# Patient Record
Sex: FEMALE | Race: WHITE | NOT HISPANIC OR LATINO | Employment: FULL TIME | URBAN - NONMETROPOLITAN AREA
[De-identification: names, ages, dates, MRNs, and addresses within clinical notes are randomized per-mention and may not be internally consistent; named-entity substitution may affect disease eponyms.]

---

## 2018-02-22 ENCOUNTER — OFFICE VISIT (OUTPATIENT)
Dept: OBSTETRICS AND GYNECOLOGY | Facility: CLINIC | Age: 38
End: 2018-02-22

## 2018-02-22 VITALS
SYSTOLIC BLOOD PRESSURE: 128 MMHG | BODY MASS INDEX: 30.18 KG/M2 | WEIGHT: 164 LBS | HEIGHT: 62 IN | DIASTOLIC BLOOD PRESSURE: 74 MMHG

## 2018-02-22 DIAGNOSIS — Z01.419 ENCOUNTER FOR GYNECOLOGICAL EXAMINATION WITHOUT ABNORMAL FINDING: Primary | ICD-10-CM

## 2018-02-22 DIAGNOSIS — N92.0 MENORRHAGIA WITH REGULAR CYCLE: ICD-10-CM

## 2018-02-22 PROCEDURE — 99385 PREV VISIT NEW AGE 18-39: CPT | Performed by: MIDWIFE

## 2018-02-22 RX ORDER — CETIRIZINE HYDROCHLORIDE 10 MG/1
10 TABLET ORAL DAILY
COMMUNITY
End: 2019-09-18 | Stop reason: ALTCHOICE

## 2018-02-22 NOTE — PROGRESS NOTES
Subjective   Chief Complaint   Patient presents with   • Gynecologic Exam     unsure of last pap, c/o heavy, painful periods that last longer since having BTL,     Vivien Rosa is a 37 y.o. year old  presenting to be seen for her annual exam.   Her periods are monthly lasting 5-6 days. She has heavy bleeding and cramping for 1 day.  She has had hemorrhoid banding x 2 and has issues with constipation.  She has had a tubal ligation.  C/O weight gain the past year but hasn't been as active.    Past Medical History:   Diagnosis Date   • Anemia    • History of blood transfusion         Current Outpatient Prescriptions:   •  cetirizine (zyrTEC) 10 MG tablet, Take 10 mg by mouth Daily., Disp: , Rfl:   •  Pediatric Multivitamins-Iron (FLINTSTONES PLUS IRON PO), Take  by mouth., Disp: , Rfl:    No Known Allergies   Past Surgical History:   Procedure Laterality Date   • HEMORROIDECTOMY     • MENG'S NEUROMA EXCISION Left    • TUBAL ABDOMINAL LIGATION        Social History     Social History   • Marital status:      Spouse name: N/A   • Number of children: N/A   • Years of education: N/A     Occupational History   • Not on file.     Social History Main Topics   • Smoking status: Never Smoker   • Smokeless tobacco: Never Used   • Alcohol use No   • Drug use: No   • Sexual activity: Not on file     Other Topics Concern   • Not on file     Social History Narrative      Family History   Problem Relation Age of Onset   • Hypertension Mother    • Hypertension Brother    • Stroke Maternal Grandmother        The following portions of the patient's history were reviewed and updated as appropriate:problem list, current medications, allergies, past family history, past medical history, past social history and past surgical history.    Review of Systems   Constitutional: Positive for unexpected weight change.   HENT: Negative.    Respiratory: Negative.    Cardiovascular: Negative.    Gastrointestinal: Positive for  "constipation.   Endocrine: Negative.    Genitourinary: Positive for menstrual problem.   Musculoskeletal: Negative.    Skin: Negative.    Neurological: Negative.    Psychiatric/Behavioral: Negative.            Objective   /74  Ht 157.5 cm (62\")  Wt 74.4 kg (164 lb)  LMP 02/12/2018  Breastfeeding? No  BMI 30 kg/m2    Physical Exam   Constitutional   The patient is awake, alert, well developed, well nourished and well groomed.   Neck   The neck is supple and the trachea is midline. The thyroid is not enlarged and there are no palpable nodules.   Breast  Inspection of the breast reveals symmetrical and smooth breast bilaterally without skin color changes, lesions, dimpling or skin retraction.  The nipples are  everted and without discharge.  Palpation of the breast the tissue is non-tender, smooth, without masses.  The axillae are without masses.   Respiratory  The patient is relaxed and breathes without effort. Lungs CTAB  Cardiovascular  Heart regular rate and rhythm without murmur.  Gastrointestinal   The abdomen is soft and non-tender.  No hepatosplenomegaly.  Genitourinary   - External Genitalia without erythema, lesions, or masses  -Urethral Meatus is without erythema, edema, prolapse or lesions.   -Bladder - Palpation at the region above the symphysis pubis             reveals no bladder tenderness.   -Vagina - There is no excessive vaginal discharge.&    vaginal walls reveals moist vaginal mucosa without inflammation or lesions    There is no evidence of pelvic relaxation; there is no cystocele or rectocele.  -Cervix  is smooth, pink, and without discharge. Negative cervical motion tenderness   Uterus - uterine body is of normal size, shape, & without tenderness  Adnexa structures are nontender and without masses  Perineum is without inflammation or lesions   Extremities  Full ROM. No cyanosis or edema   Skin  Normal in color, texture, moisture, temperature, mobility and turgor. There are no abnormal " lesions.    Psychiatric  The patient is oriented to person, place, and time. Speech is fluent and words are clear          Assessment /Plan      Vivien was seen today for gynecologic exam.    Diagnoses and all orders for this visit:    Encounter for gynecological examination without abnormal finding  -     Liquid-based Pap Smear, Screening; Future    Menorrhagia with regular cycle    Pap was done today.  If she does not receive the results of the Pap within 2 weeks  time, she was instructed to call to find out the results.  I explained to Vivien that the recommendations for Pap smear interval in a low risk patient has lengthened to 3 years time if cytology alone normal or  5 years time if both cytology and HPV testing were normal.  I encouraged her to be seen yearly for a full physical exam including breast and pelvic exam even during the off years when PAP's will not be performed.    Discussed treatment with OCs for cycle control.  May return to have a TVS if desires  Wants to continue to monitor for now.       Encouraged BSE    This note was electronically signed.    Sujata Maldonado CNM   February 22, 2018

## 2018-03-02 DIAGNOSIS — Z01.419 ENCOUNTER FOR GYNECOLOGICAL EXAMINATION WITHOUT ABNORMAL FINDING: ICD-10-CM

## 2018-11-09 ENCOUNTER — RESULTS ENCOUNTER (OUTPATIENT)
Dept: FAMILY MEDICINE CLINIC | Facility: CLINIC | Age: 38
End: 2018-11-09

## 2018-11-09 ENCOUNTER — OFFICE VISIT (OUTPATIENT)
Dept: FAMILY MEDICINE CLINIC | Facility: CLINIC | Age: 38
End: 2018-11-09

## 2018-11-09 VITALS
HEART RATE: 74 BPM | WEIGHT: 167 LBS | HEIGHT: 62 IN | BODY MASS INDEX: 30.73 KG/M2 | SYSTOLIC BLOOD PRESSURE: 112 MMHG | OXYGEN SATURATION: 98 % | DIASTOLIC BLOOD PRESSURE: 72 MMHG

## 2018-11-09 DIAGNOSIS — Z00.00 WELL WOMAN EXAM WITHOUT GYNECOLOGICAL EXAM: ICD-10-CM

## 2018-11-09 DIAGNOSIS — H04.123 DRY EYE SYNDROME, BILATERAL: ICD-10-CM

## 2018-11-09 DIAGNOSIS — Z13.1 SCREENING FOR DIABETES MELLITUS: ICD-10-CM

## 2018-11-09 DIAGNOSIS — Z13.6 ENCOUNTER FOR LIPID SCREENING FOR CARDIOVASCULAR DISEASE: ICD-10-CM

## 2018-11-09 DIAGNOSIS — Z00.00 WELL WOMAN EXAM WITHOUT GYNECOLOGICAL EXAM: Primary | ICD-10-CM

## 2018-11-09 DIAGNOSIS — Z13.220 ENCOUNTER FOR LIPID SCREENING FOR CARDIOVASCULAR DISEASE: ICD-10-CM

## 2018-11-09 DIAGNOSIS — G56.01 CARPAL TUNNEL SYNDROME ON RIGHT: ICD-10-CM

## 2018-11-09 DIAGNOSIS — Z23 NEED FOR HEPATITIS A IMMUNIZATION: ICD-10-CM

## 2018-11-09 PROCEDURE — 99385 PREV VISIT NEW AGE 18-39: CPT | Performed by: FAMILY MEDICINE

## 2018-11-09 RX ORDER — MELOXICAM 15 MG/1
TABLET ORAL
Refills: 0 | COMMUNITY
Start: 2018-09-21 | End: 2019-01-06

## 2018-11-09 NOTE — PATIENT INSTRUCTIONS
Preventive Care 18-39 Years, Female  Preventive care refers to lifestyle choices and visits with your health care provider that can promote health and wellness.  What does preventive care include?  · A yearly physical exam. This is also called an annual well check.  · Dental exams once or twice a year.  · Routine eye exams. Ask your health care provider how often you should have your eyes checked.  · Personal lifestyle choices, including:  ? Daily care of your teeth and gums.  ? Regular physical activity.  ? Eating a healthy diet.  ? Avoiding tobacco and drug use.  ? Limiting alcohol use.  ? Practicing safe sex.  ? Taking vitamin and mineral supplements as recommended by your health care provider.  What happens during an annual well check?  The services and screenings done by your health care provider during your annual well check will depend on your age, overall health, lifestyle risk factors, and family history of disease.  Counseling  Your health care provider may ask you questions about your:  · Alcohol use.  · Tobacco use.  · Drug use.  · Emotional well-being.  · Home and relationship well-being.  · Sexual activity.  · Eating habits.  · Work and work environment.  · Method of birth control.  · Menstrual cycle.  · Pregnancy history.    Screening  You may have the following tests or measurements:  · Height, weight, and BMI.  · Diabetes screening. This is done by checking your blood sugar (glucose) after you have not eaten for a while (fasting).  · Blood pressure.  · Lipid and cholesterol levels. These may be checked every 5 years starting at age 20.  · Skin check.  · Hepatitis C blood test.  · Hepatitis B blood test.  · Sexually transmitted disease (STD) testing.  · BRCA-related cancer screening. This may be done if you have a family history of breast, ovarian, tubal, or peritoneal cancers.  · Pelvic exam and Pap test. This may be done every 3 years starting at age 21. Starting at age 30, this may be done every 5  years if you have a Pap test in combination with an HPV test.    Discuss your test results, treatment options, and if necessary, the need for more tests with your health care provider.  Vaccines  Your health care provider may recommend certain vaccines, such as:  · Influenza vaccine. This is recommended every year.  · Tetanus, diphtheria, and acellular pertussis (Tdap, Td) vaccine. You may need a Td booster every 10 years.  · Varicella vaccine. You may need this if you have not been vaccinated.  · HPV vaccine. If you are 26 or younger, you may need three doses over 6 months.  · Measles, mumps, and rubella (MMR) vaccine. You may need at least one dose of MMR. You may also need a second dose.  · Pneumococcal 13-valent conjugate (PCV13) vaccine. You may need this if you have certain conditions and were not previously vaccinated.  · Pneumococcal polysaccharide (PPSV23) vaccine. You may need one or two doses if you smoke cigarettes or if you have certain conditions.  · Meningococcal vaccine. One dose is recommended if you are age 19-21 years and a first-year college student living in a residence [place], or if you have one of several medical conditions. You may also need additional booster doses.  · Hepatitis A vaccine. You may need this if you have certain conditions or if you travel or work in places where you may be exposed to hepatitis A.  · Hepatitis B vaccine. You may need this if you have certain conditions or if you travel or work in places where you may be exposed to hepatitis B.  · Haemophilus influenzae type b (Hib) vaccine. You may need this if you have certain risk factors.    Talk to your health care provider about which screenings and vaccines you need and how often you need them.  This information is not intended to replace advice given to you by your health care provider. Make sure you discuss any questions you have with your health care provider.  Document Released: 02/13/2003 Document Revised: 09/06/2017  Document Reviewed: 10/18/2016  [employer] Interactive Patient Education © 2018 [employer]

## 2018-11-09 NOTE — PROGRESS NOTES
Subjective   Vivien Rosa is a 38 y.o. female.     History of Present Illness  Mrs. Rosa presents today as a new pt to establish care. She was previously followed by Dr. Washington at [facility].     Reproductive History  [id number]  Pregnancy complications (HTN, DM): anemia, required blood tranfusion post-partum  Sexual Activity: yes, monogamous male   Contraception: s/p tubal  Menses: heavy but better on OCPs  STD hx: denies  H/O abnormal pap: no  Cervical procedures: none    Social Hx  Household members: self, spouse, 2 boys  Marital status:   Tobacco use: never  EtOH use: none  Illicit drug use: none    Lifestyle  Diet: varied, generally healthy, excess calories  Exercise: irregular  Using seatbelt: yes  Sunscreen: yes  Mental Health: no concerns  Feels safe in home: yes    Screenings  Last pap: 2018  Last breast exam: 2018  Last mammogram: n/a  Colonoscopy:  due to hemorrhoids  DEXA: n/a  FLP: ?1 year  BG/A1c: ?1 year  Vitamin D: unknown  Last dental check up: regular  Last vision exam: regular, seen for dry eyes, eye plugs in place  Immunizations UTD: yes    The following portions of the patient's history were reviewed and updated as appropriate: allergies, current medications, past family history, past medical history, past social history, past surgical history and problem list.    Review of Systems   Constitutional: Positive for unexpected weight change. Negative for appetite change, fatigue and fever.   HENT: Negative for congestion, ear pain, hearing loss, nosebleeds, rhinorrhea, sneezing, sore throat, tinnitus and trouble swallowing.    Eyes: Negative for pain, discharge and redness.        Dry eyes   Respiratory: Negative for cough, chest tightness, shortness of breath and wheezing.    Cardiovascular: Negative for chest pain, palpitations and leg swelling.   Gastrointestinal: Negative for abdominal pain, blood in stool, constipation, diarrhea, nausea and vomiting.   Endocrine:  Negative for cold intolerance, heat intolerance, polydipsia and polyuria.   Genitourinary: Positive for menstrual problem. Negative for dysuria, flank pain, frequency, hematuria and urgency.   Musculoskeletal: Negative for arthralgias, back pain, joint swelling, myalgias and neck pain.   Skin: Negative for rash and wound.   Allergic/Immunologic: Negative for food allergies.   Neurological: Positive for weakness (right wrist) and numbness (right wrist). Negative for dizziness, tremors, seizures, syncope, speech difficulty and headaches.   Hematological: Negative for adenopathy. Does not bruise/bleed easily.   Psychiatric/Behavioral: Negative for confusion, dysphoric mood, sleep disturbance and suicidal ideas. The patient is not nervous/anxious.        Objective    Vitals:    11/09/18 0927   BP: 112/72   Pulse: 74   SpO2: 98%     Body mass index is 30.54 kg/m².      11/09/18 0927   Weight: 75.8 kg (167 lb)       Physical Exam   Constitutional: She is oriented to person, place, and time. She appears well-developed and well-nourished. She is cooperative. She does not appear ill. No distress.   obese   HENT:   Head: Normocephalic and atraumatic.   Right Ear: Tympanic membrane, external ear and ear canal normal.   Left Ear: Tympanic membrane, external ear and ear canal normal.   Nose: Nose normal. No mucosal edema or rhinorrhea.   Mouth/Throat: Oropharynx is clear and moist and mucous membranes are normal. No oral lesions. No posterior oropharyngeal erythema.   Eyes: Conjunctivae, EOM and lids are normal.   Neck: Phonation normal. Neck supple. Normal carotid pulses present. No thyromegaly present.   Cardiovascular: Normal rate, regular rhythm, S1 normal, S2 normal and intact distal pulses. Exam reveals no gallop.   No murmur heard.  Pulmonary/Chest: Effort normal and breath sounds normal.   Musculoskeletal: Normal range of motion. She exhibits no edema, tenderness or deformity.     Vascular Status -  Her right foot  exhibits no edema. Her left foot exhibits no edema.  Lymphadenopathy:     She has no cervical adenopathy.   Neurological: She is alert and oriented to person, place, and time. She has normal strength. She displays no tremor. No cranial nerve deficit or sensory deficit. Gait normal.   Skin: Skin is warm and dry. No ecchymosis and no rash noted. No cyanosis. Nails show no clubbing.   Psychiatric: She has a normal mood and affect. Her speech is normal and behavior is normal. Judgment and thought content normal. Cognition and memory are normal.   Good eye contact. Answers questions appropriately. Good personal hygiene and grooming.   Nursing note and vitals reviewed.      Assessment/Plan   Vivien was seen today for establish care.    Diagnoses and all orders for this visit:    Well woman exam without gynecological exam  -     Cancel: CBC (No Diff)  -     Cancel: Comprehensive Metabolic Panel  -     Cancel: Lipid Panel  -     Cancel: Hemoglobin A1c  -     Cancel: TSH Rfx On Abnormal To Free T4  -     Comprehensive Metabolic Panel; Future  -     Lipid Panel; Future  -     Hemoglobin A1c; Future  -     TSH Rfx On Abnormal To Free T4; Future  -     CBC (No Diff); Future    Encounter for lipid screening for cardiovascular disease  -     Cancel: Lipid Panel  -     Lipid Panel; Future    Screening for diabetes mellitus  -     Cancel: Hemoglobin A1c  -     Hemoglobin A1c; Future    Carpal tunnel syndrome on right    Dry eye syndrome, bilateral       Age appropriate preventive care reviewed including cancer screenings, safety measures, mental health concerns, supplements, prevention of CV disease and DM, etc. Handout provided.    Pt advised to eat a heart healthy diet and get regular aerobic exercise.    I will contact patient regarding test results and provide instructions regarding any necessary changes in plan of care.    F/u with Dr. Leger regarding further tx of CTSx on right.     F/u with ophthalmology regarding tx of  chronic dry eye.    I will contact patient regarding test results and provide instructions regarding any necessary changes in plan of care.  Records requested from previous primary provider as well as any consulting physician, admitting hospitals, etc. Further recommendations pending review.  Patient was encouraged to keep me informed of any acute changes, lack of improvement, or any new concerning symptoms.  Pt is aware of reasons to seek emergent care.  Patient voiced understanding of all instructions and denied further questions.

## 2018-11-10 PROBLEM — N60.12 FIBROCYSTIC BREAST CHANGES OF BOTH BREASTS: Status: ACTIVE | Noted: 2018-11-10

## 2018-11-10 PROBLEM — N60.11 FIBROCYSTIC BREAST CHANGES OF BOTH BREASTS: Status: ACTIVE | Noted: 2018-11-10

## 2018-11-10 PROBLEM — Z87.09 HISTORY OF ACUTE BRONCHITIS WITH BRONCHOSPASM: Status: ACTIVE | Noted: 2018-11-10

## 2018-11-10 PROBLEM — G56.01 CARPAL TUNNEL SYNDROME ON RIGHT: Status: ACTIVE | Noted: 2018-11-10

## 2018-11-10 PROBLEM — H04.123 DRY EYE SYNDROME, BILATERAL: Status: ACTIVE | Noted: 2018-11-10

## 2018-11-13 DIAGNOSIS — D75.839 THROMBOCYTOSIS: Primary | ICD-10-CM

## 2018-11-13 LAB
ALBUMIN SERPL-MCNC: 4.5 G/DL (ref 3.5–5.5)
ALBUMIN/GLOB SERPL: 1.9 {RATIO} (ref 1.2–2.2)
ALP SERPL-CCNC: 84 IU/L (ref 39–117)
ALT SERPL-CCNC: 20 IU/L (ref 0–32)
AST SERPL-CCNC: 16 IU/L (ref 0–40)
BILIRUB SERPL-MCNC: 0.3 MG/DL (ref 0–1.2)
BUN SERPL-MCNC: 14 MG/DL (ref 6–20)
BUN/CREAT SERPL: 22 (ref 9–23)
CALCIUM SERPL-MCNC: 9.1 MG/DL (ref 8.7–10.2)
CHLORIDE SERPL-SCNC: 102 MMOL/L (ref 96–106)
CHOLEST SERPL-MCNC: 143 MG/DL (ref 100–199)
CO2 SERPL-SCNC: 22 MMOL/L (ref 20–29)
CREAT SERPL-MCNC: 0.63 MG/DL (ref 0.57–1)
ERYTHROCYTE [DISTWIDTH] IN BLOOD BY AUTOMATED COUNT: 13.5 % (ref 12.3–15.4)
GLOBULIN SER CALC-MCNC: 2.4 G/DL (ref 1.5–4.5)
GLUCOSE SERPL-MCNC: 82 MG/DL (ref 65–99)
HBA1C MFR BLD: 5.5 % (ref 4.8–5.6)
HCT VFR BLD AUTO: 36.2 % (ref 34–46.6)
HDLC SERPL-MCNC: 53 MG/DL
HGB BLD-MCNC: 11.9 G/DL (ref 11.1–15.9)
LDLC SERPL CALC-MCNC: 67 MG/DL (ref 0–99)
MCH RBC QN AUTO: 28.7 PG (ref 26.6–33)
MCHC RBC AUTO-ENTMCNC: 32.9 G/DL (ref 31.5–35.7)
MCV RBC AUTO: 87 FL (ref 79–97)
PLATELET # BLD AUTO: 449 X10E3/UL (ref 150–379)
POTASSIUM SERPL-SCNC: 3.8 MMOL/L (ref 3.5–5.2)
PROT SERPL-MCNC: 6.9 G/DL (ref 6–8.5)
RBC # BLD AUTO: 4.15 X10E6/UL (ref 3.77–5.28)
SODIUM SERPL-SCNC: 141 MMOL/L (ref 134–144)
TRIGL SERPL-MCNC: 117 MG/DL (ref 0–149)
TSH SERPL DL<=0.005 MIU/L-ACNC: 1.53 UIU/ML (ref 0.45–4.5)
VLDLC SERPL CALC-MCNC: 23 MG/DL (ref 5–40)
WBC # BLD AUTO: 8.7 X10E3/UL (ref 3.4–10.8)

## 2018-12-14 ENCOUNTER — TRANSCRIBE ORDERS (OUTPATIENT)
Dept: ADMINISTRATIVE | Facility: HOSPITAL | Age: 38
End: 2018-12-14

## 2018-12-14 DIAGNOSIS — R20.2 NUMBNESS AND TINGLING: Primary | ICD-10-CM

## 2018-12-14 DIAGNOSIS — R20.0 NUMBNESS AND TINGLING: Primary | ICD-10-CM

## 2018-12-26 ENCOUNTER — HOSPITAL ENCOUNTER (OUTPATIENT)
Dept: MRI IMAGING | Facility: HOSPITAL | Age: 38
Discharge: HOME | End: 2018-12-26
Attending: ORTHOPAEDIC SURGERY | Admitting: ORTHOPAEDIC SURGERY

## 2018-12-26 DIAGNOSIS — R20.0 NUMBNESS AND TINGLING: ICD-10-CM

## 2018-12-26 DIAGNOSIS — R20.2 NUMBNESS AND TINGLING: ICD-10-CM

## 2018-12-26 PROCEDURE — 70553 MRI BRAIN STEM W/O & W/DYE: CPT

## 2018-12-26 PROCEDURE — A9577 INJ MULTIHANCE: HCPCS | Performed by: ORTHOPAEDIC SURGERY

## 2018-12-26 PROCEDURE — 0 GADOBENATE DIMEGLUMINE 529 MG/ML SOLUTION: Performed by: ORTHOPAEDIC SURGERY

## 2018-12-26 RX ADMIN — GADOBENATE DIMEGLUMINE 15 ML: 529 INJECTION, SOLUTION INTRAVENOUS at 13:39

## 2019-01-06 ENCOUNTER — OFFICE VISIT (OUTPATIENT)
Dept: RETAIL CLINIC | Facility: CLINIC | Age: 39
End: 2019-01-06

## 2019-01-06 VITALS
DIASTOLIC BLOOD PRESSURE: 74 MMHG | OXYGEN SATURATION: 98 % | HEART RATE: 78 BPM | WEIGHT: 167 LBS | TEMPERATURE: 97.3 F | SYSTOLIC BLOOD PRESSURE: 122 MMHG | RESPIRATION RATE: 20 BRPM | BODY MASS INDEX: 30.54 KG/M2

## 2019-01-06 DIAGNOSIS — J20.9 ACUTE BRONCHITIS, UNSPECIFIED ORGANISM: ICD-10-CM

## 2019-01-06 DIAGNOSIS — J01.40 ACUTE NON-RECURRENT PANSINUSITIS: Primary | ICD-10-CM

## 2019-01-06 PROCEDURE — 99213 OFFICE O/P EST LOW 20 MIN: CPT | Performed by: NURSE PRACTITIONER

## 2019-01-06 RX ORDER — BROMPHENIRAMINE MALEATE, PSEUDOEPHEDRINE HYDROCHLORIDE, AND DEXTROMETHORPHAN HYDROBROMIDE 2; 30; 10 MG/5ML; MG/5ML; MG/5ML
10 SYRUP ORAL 4 TIMES DAILY PRN
Qty: 200 ML | Refills: 0 | Status: SHIPPED | OUTPATIENT
Start: 2019-01-06 | End: 2019-01-11

## 2019-01-06 RX ORDER — FLUTICASONE PROPIONATE 50 MCG
1 SPRAY, SUSPENSION (ML) NASAL 2 TIMES DAILY
Qty: 1 BOTTLE | Refills: 1 | Status: SHIPPED | OUTPATIENT
Start: 2019-01-06 | End: 2019-01-13

## 2019-01-06 RX ORDER — PREDNISONE 10 MG/1
TABLET ORAL DAILY
Qty: 21 EACH | Refills: 0 | Status: SHIPPED | OUTPATIENT
Start: 2019-01-06 | End: 2019-01-12

## 2019-01-06 NOTE — PATIENT INSTRUCTIONS
Sinusitis, Adult  Sinusitis is soreness and inflammation of your sinuses. Sinuses are hollow spaces in the bones around your face. They are located:  · Around your eyes.  · In the middle of your forehead.  · Behind your nose.  · In your cheekbones.    Your sinuses and nasal passages are lined with a stringy fluid (mucus). Mucus normally drains out of your sinuses. When your nasal tissues get inflamed or swollen, the mucus can get trapped or blocked so air cannot flow through your sinuses. This lets bacteria, viruses, and funguses grow, and that leads to infection.  Follow these instructions at home:  Medicines  · Take, use, or apply over-the-counter and prescription medicines only as told by your doctor. These may include nasal sprays.  · If you were prescribed an antibiotic medicine, take it as told by your doctor. Do not stop taking the antibiotic even if you start to feel better.  Hydrate and Humidify  · Drink enough water to keep your pee (urine) clear or pale yellow.  · Use a cool mist humidifier to keep the humidity level in your home above 50%.  · Breathe in steam for 10-15 minutes, 3-4 times a day or as told by your doctor. You can do this in the bathroom while a hot shower is running.  · Try not to spend time in cool or dry air.  Rest  · Rest as much as possible.  · Sleep with your head raised (elevated).  · Make sure to get enough sleep each night.  General instructions  · Put a warm, moist washcloth on your face 3-4 times a day or as told by your doctor. This will help with discomfort.  · Wash your hands often with soap and water. If there is no soap and water, use hand .  · Do not smoke. Avoid being around people who are smoking (secondhand smoke).  · Keep all follow-up visits as told by your doctor. This is important.  Contact a doctor if:  · You have a fever.  · Your symptoms get worse.  · Your symptoms do not get better within 10 days.  Get help right away if:  · You have a very bad  headache.  · You cannot stop throwing up (vomiting).  · You have pain or swelling around your face or eyes.  · You have trouble seeing.  · You feel confused.  · Your neck is stiff.  · You have trouble breathing.  This information is not intended to replace advice given to you by your health care provider. Make sure you discuss any questions you have with your health care provider.  Document Released: 06/05/2009 Document Revised: 08/13/2017 Document Reviewed: 10/12/2016  [employer] Interactive Patient Education © 2018 [employer]  Acute Bronchitis, Adult  Acute bronchitis is when air tubes (bronchi) in the lungs suddenly get swollen. The condition can make it hard to breathe. It can also cause these symptoms:  · A cough.  · Coughing up clear, yellow, or green mucus.  · Wheezing.  · Chest congestion.  · Shortness of breath.  · A fever.  · Body aches.  · Chills.  · A sore throat.    Follow these instructions at home:  Medicines  · Take over-the-counter and prescription medicines only as told by your doctor.  · If you were prescribed an antibiotic medicine, take it as told by your doctor. Do not stop taking the antibiotic even if you start to feel better.  General instructions  · Rest.  · Drink enough fluids to keep your pee (urine) clear or pale yellow.  · Avoid smoking and secondhand smoke. If you smoke and you need help quitting, ask your doctor. Quitting will help your lungs heal faster.  · Use an inhaler, cool mist vaporizer, or humidifier as told by your doctor.  · Keep all follow-up visits as told by your doctor. This is important.  How is this prevented?  To lower your risk of getting this condition again:  · Wash your hands often with soap and water. If you cannot use soap and water, use hand .  · Avoid contact with people who have cold symptoms.  · Try not to touch your hands to your mouth, nose, or eyes.  · Make sure to get the flu shot every year.    Contact a doctor if:  · Your symptoms do not get  better in 2 weeks.  Get help right away if:  · You cough up blood.  · You have chest pain.  · You have very bad shortness of breath.  · You become dehydrated.  · You faint (pass out) or keep feeling like you are going to pass out.  · You keep throwing up (vomiting).  · You have a very bad headache.  · Your fever or chills gets worse.  This information is not intended to replace advice given to you by your health care provider. Make sure you discuss any questions you have with your health care provider.  Document Released: 06/05/2009 Document Revised: 07/26/2017 Document Reviewed: 06/07/2017  [employer] Interactive Patient Education © 2018 [employer]

## 2019-01-06 NOTE — PROGRESS NOTES
"URI      Subjective   Vivien Rosa is a 38 y.o. female.     URI    This is a new problem. Episode onset: 2 days ago  The problem has been gradually worsening. There has been no fever. Associated symptoms include chest pain (lower throat, upper chest \"burning\" ), congestion, coughing, ear pain (bilaterally; R>L ), headaches, rhinorrhea, sneezing and a sore throat. Pertinent negatives include no abdominal pain, diarrhea, nausea, rash, vomiting or wheezing. Treatments tried: Neti pot, Farhana seltzer cold and sinus.  The treatment provided no relief.    Has had influenza vaccine.   ill with similar symptoms.  See ROS.      Patient Active Problem List   Diagnosis   • Vertigo   • Carpal tunnel syndrome on right   • Fibrocystic breast changes of both breasts   • Dry eye syndrome, bilateral   • History of acute bronchitis with bronchospasm       No Known Allergies     Current Outpatient Medications on File Prior to Visit   Medication Sig Dispense Refill   • cetirizine (zyrTEC) 10 MG tablet Take 10 mg by mouth Daily.     • Pediatric Multivitamins-Iron (FLINTSTONES PLUS IRON PO) Take  by mouth.     • [DISCONTINUED] meloxicam (MOBIC) 15 MG tablet TK 1 T PO ONCE QD  0     No current facility-administered medications on file prior to visit.        Past Medical History:   Diagnosis Date   • Allergic    • Anemia    • History of blood transfusion 2014    post-partum   • Sinusitis    • Vertigo        Past Surgical History:   Procedure Laterality Date   • HEMORROIDECTOMY     • MENG'S NEUROMA EXCISION Left    • TUBAL ABDOMINAL LIGATION     • VARICOSE VEIN SURGERY         Family History   Problem Relation Age of Onset   • Hypertension Mother    • Hypertension Brother    • Stroke Maternal Grandmother    • Heart disease Father    • Heart attack Father    • Thyroid disease Father    • Alcohol abuse Father    • Asperger's syndrome Sister        Social History     Socioeconomic History   • Marital status:      Spouse name: " "Not on file   • Number of children: Not on file   • Years of education: Not on file   • Highest education level: Not on file   Social Needs   • Financial resource strain: Not on file   • Food insecurity - worry: Not on file   • Food insecurity - inability: Not on file   • Transportation needs - medical: Not on file   • Transportation needs - non-medical: Not on file   Occupational History   • Occupation:    Tobacco Use   • Smoking status: Never Smoker   • Smokeless tobacco: Never Used   Substance and Sexual Activity   • Alcohol use: No   • Drug use: No   • Sexual activity: Yes     Partners: Male     Birth control/protection: Surgical   Other Topics Concern   • Not on file   Social History Narrative   • Not on file       Travel:  No recent travel within the last 21 days outside the [place] Denies recent travel to one of the following [place]  Countries:  [place], [place], [place], or [place].  Denies contact with anyone who has traveled to one of the following [place]  Countries: [place], [place], [place], or [place] within the last 21 days and is known or suspected to have Ebola.  Denies having had any contact with the human remains, blood or any bodily fluids of someone who is known or suspected to have Ebola within the last 21 days.     OB History      Para Term  AB Living    2 2 2     2    SAB TAB Ectopic Molar Multiple Live Births              2          Review of Systems   Constitutional: Negative for chills, diaphoresis and fever.   HENT: Positive for congestion, dental problem, ear pain (bilaterally; R>L ), postnasal drip, rhinorrhea, sinus pressure, sneezing, sore throat and voice change (hoarseness, voice loss ). Negative for ear discharge, mouth sores and nosebleeds.    Respiratory: Positive for cough. Negative for shortness of breath and wheezing.    Cardiovascular: Positive for chest pain (lower throat, upper chest \"burning\" ).   Gastrointestinal: Negative for abdominal " pain, diarrhea, nausea and vomiting.   Musculoskeletal: Negative for myalgias.   Skin: Negative for rash.   Neurological: Positive for light-headedness and headaches.       /74 (BP Location: Right arm, Patient Position: Sitting, Cuff Size: Adult)   Pulse 78   Temp 97.3 °F (36.3 °C) (Temporal)   Resp 20   Wt 75.8 kg (167 lb)   LMP 12/08/2018 (Exact Date)   SpO2 98%   Breastfeeding? No   BMI 30.54 kg/m²     Objective   Physical Exam   Constitutional: She is oriented to person, place, and time. She appears well-developed and well-nourished. She is cooperative. She appears ill (mild ). No distress.   HENT:   Head: Normocephalic.   Right Ear: External ear and ear canal normal. Tympanic membrane is not injected, not scarred, not perforated, not erythematous, not retracted and not bulging. A middle ear effusion (mild serous ) is present.   Left Ear: External ear and ear canal normal. Tympanic membrane is not injected, not scarred, not perforated, not erythematous, not retracted and not bulging. A middle ear effusion (mild serous ) is present.   Nose: Mucosal edema (boggy nasal turbinates ) and rhinorrhea (clear ) present. Right sinus exhibits maxillary sinus tenderness and frontal sinus tenderness. Left sinus exhibits maxillary sinus tenderness and frontal sinus tenderness.   Mouth/Throat: Uvula is midline. Mucous membranes are dry. No posterior oropharyngeal edema or posterior oropharyngeal erythema. Tonsils are 1+ on the right. Tonsils are 1+ on the left. No tonsillar exudate.   Cardiovascular: Normal rate, regular rhythm and normal heart sounds.   Pulmonary/Chest: Effort normal and breath sounds normal. She has no decreased breath sounds. She has no wheezes. She has no rhonchi. She has no rales.   Lymphadenopathy:        Head (right side): Tonsillar adenopathy present. No preauricular, no posterior auricular and no occipital adenopathy present.        Head (left side): Tonsillar adenopathy present. No  preauricular, no posterior auricular and no occipital adenopathy present.     She has no cervical adenopathy.   Neurological: She is alert and oriented to person, place, and time.   Skin: Skin is warm, dry and intact. No rash noted.       Assessment/Plan   Vivien was seen today for uri.    Diagnoses and all orders for this visit:    Acute non-recurrent pansinusitis  -     fluticasone (FLONASE ALLERGY RELIEF) 50 MCG/ACT nasal spray; 1 spray into the nostril(s) as directed by provider 2 (Two) Times a Day for 7 days.  -     brompheniramine-pseudoephedrine-DM 30-2-10 MG/5ML syrup; Take 10 mL by mouth 4 (Four) Times a Day As Needed for Congestion or Cough for up to 5 days.    Acute bronchitis, unspecified organism  -     PredniSONE (DELTASONE) 10 MG (21) tablet pack; Take  by mouth Daily for 6 days.  -     brompheniramine-pseudoephedrine-DM 30-2-10 MG/5ML syrup; Take 10 mL by mouth 4 (Four) Times a Day As Needed for Congestion or Cough for up to 5 days.    Rest, increase fluids, warm salt water gargles, neti pot rinses.  Follow up with PCP if symptoms worsen/do not improve.  Visit summary provided today.  Questions/concerns addressed.      Return if symptoms worsen or fail to improve.

## 2019-01-10 ENCOUNTER — TELEPHONE (OUTPATIENT)
Dept: RETAIL CLINIC | Facility: CLINIC | Age: 39
End: 2019-01-10

## 2019-01-10 PROBLEM — M50.30 DDD (DEGENERATIVE DISC DISEASE), CERVICAL: Status: ACTIVE | Noted: 2019-01-10

## 2019-01-10 RX ORDER — FLUCONAZOLE 150 MG/1
150 TABLET ORAL ONCE
Qty: 1 TABLET | Refills: 0 | Status: SHIPPED | OUTPATIENT
Start: 2019-01-10 | End: 2019-01-10

## 2019-01-10 RX ORDER — CEFDINIR 300 MG/1
300 CAPSULE ORAL 2 TIMES DAILY
Qty: 20 CAPSULE | Refills: 0 | Status: SHIPPED | OUTPATIENT
Start: 2019-01-10 | End: 2019-01-20

## 2019-01-10 RX ORDER — DEXTROMETHORPHAN HYDROBROMIDE AND PROMETHAZINE HYDROCHLORIDE 15; 6.25 MG/5ML; MG/5ML
5 SYRUP ORAL 4 TIMES DAILY PRN
Qty: 118 ML | Refills: 0 | Status: SHIPPED | OUTPATIENT
Start: 2019-01-10 | End: 2019-02-20 | Stop reason: SDUPTHER

## 2019-01-10 NOTE — TELEPHONE ENCOUNTER
Called patient to check on her today.  States that sinus pressure has improved some.  But cough continues to be bad.  Medications prescribed today.  Plan of care discussed. Advised her to not drive and/or operate heavy machinery while taking the Promethazine cough syrup and to give it 6 hours between taking it and the Bromfed DM.  If symptoms do not improve with new medications.  FU with PCP.  Patient verbalized understanding of instructions given today.  Visit summary provided.  Questions/concerns addressed.

## 2019-02-20 ENCOUNTER — OFFICE VISIT (OUTPATIENT)
Dept: FAMILY MEDICINE CLINIC | Facility: CLINIC | Age: 39
End: 2019-02-20

## 2019-02-20 VITALS
WEIGHT: 167 LBS | OXYGEN SATURATION: 98 % | TEMPERATURE: 98 F | DIASTOLIC BLOOD PRESSURE: 80 MMHG | HEART RATE: 87 BPM | BODY MASS INDEX: 30.73 KG/M2 | SYSTOLIC BLOOD PRESSURE: 130 MMHG | HEIGHT: 62 IN

## 2019-02-20 DIAGNOSIS — J06.9 ACUTE URI: Primary | ICD-10-CM

## 2019-02-20 DIAGNOSIS — R06.83 SNORING: ICD-10-CM

## 2019-02-20 DIAGNOSIS — R05.9 COUGH: ICD-10-CM

## 2019-02-20 DIAGNOSIS — J02.9 SORE THROAT: ICD-10-CM

## 2019-02-20 DIAGNOSIS — R50.9 FEVER, UNSPECIFIED FEVER CAUSE: ICD-10-CM

## 2019-02-20 DIAGNOSIS — J98.01 BRONCHOSPASM: ICD-10-CM

## 2019-02-20 DIAGNOSIS — R29.818 SUSPECTED SLEEP APNEA: ICD-10-CM

## 2019-02-20 LAB
EXPIRATION DATE: NORMAL
EXPIRATION DATE: NORMAL
FLUAV AG NPH QL: NEGATIVE
FLUBV AG NPH QL: NEGATIVE
INTERNAL CONTROL: NORMAL
INTERNAL CONTROL: NORMAL
Lab: NORMAL
Lab: NORMAL
S PYO AG THROAT QL: NEGATIVE

## 2019-02-20 PROCEDURE — 99214 OFFICE O/P EST MOD 30 MIN: CPT | Performed by: FAMILY MEDICINE

## 2019-02-20 PROCEDURE — 96372 THER/PROPH/DIAG INJ SC/IM: CPT | Performed by: FAMILY MEDICINE

## 2019-02-20 PROCEDURE — 87880 STREP A ASSAY W/OPTIC: CPT | Performed by: FAMILY MEDICINE

## 2019-02-20 PROCEDURE — 87804 INFLUENZA ASSAY W/OPTIC: CPT | Performed by: FAMILY MEDICINE

## 2019-02-20 RX ORDER — DOXYCYCLINE 100 MG/1
100 TABLET ORAL 2 TIMES DAILY
Qty: 20 TABLET | Refills: 0 | Status: SHIPPED | OUTPATIENT
Start: 2019-02-20 | End: 2019-03-02

## 2019-02-20 RX ORDER — METHYLPREDNISOLONE ACETATE 80 MG/ML
120 INJECTION, SUSPENSION INTRA-ARTICULAR; INTRALESIONAL; INTRAMUSCULAR; SOFT TISSUE ONCE
Status: COMPLETED | OUTPATIENT
Start: 2019-02-20 | End: 2019-02-20

## 2019-02-20 RX ORDER — ALBUTEROL SULFATE 90 UG/1
2 AEROSOL, METERED RESPIRATORY (INHALATION) EVERY 4 HOURS PRN
Qty: 18 G | Refills: 1 | Status: SHIPPED | OUTPATIENT
Start: 2019-02-20 | End: 2019-11-19 | Stop reason: SDUPTHER

## 2019-02-20 RX ORDER — FLUTICASONE PROPIONATE 50 MCG
SPRAY, SUSPENSION (ML) NASAL
Refills: 1 | COMMUNITY
Start: 2019-02-06 | End: 2020-06-09

## 2019-02-20 RX ORDER — BENZONATATE 200 MG/1
200 CAPSULE ORAL 3 TIMES DAILY PRN
Qty: 30 CAPSULE | Refills: 0 | Status: SHIPPED | OUTPATIENT
Start: 2019-02-20 | End: 2019-09-18

## 2019-02-20 RX ORDER — DEXTROMETHORPHAN HYDROBROMIDE AND PROMETHAZINE HYDROCHLORIDE 15; 6.25 MG/5ML; MG/5ML
5 SYRUP ORAL 4 TIMES DAILY PRN
Qty: 118 ML | Refills: 0 | Status: SHIPPED | OUTPATIENT
Start: 2019-02-20 | End: 2019-02-25

## 2019-02-20 RX ADMIN — METHYLPREDNISOLONE ACETATE 120 MG: 80 INJECTION, SUSPENSION INTRA-ARTICULAR; INTRALESIONAL; INTRAMUSCULAR; SOFT TISSUE at 10:38

## 2019-02-20 NOTE — PROGRESS NOTES
Subjective   Vivien Rosa is a 38 y.o. female.     She c/o cough, congestion      Cough   This is a new problem. The current episode started in the past 7 days. The problem has been rapidly worsening. The problem occurs every few minutes. The cough is productive of sputum. Associated symptoms include chest pain (with cough), ear congestion, ear pain, a fever (subjective), headaches, nasal congestion, postnasal drip, rhinorrhea, a sore throat, shortness of breath and wheezing. Pertinent negatives include no chills, eye redness, hemoptysis, myalgias or rash (resolved). The symptoms are aggravated by lying down, exercise and cold air. She has tried OTC cough suppressant (flonase) for the symptoms. The treatment provided no relief. treated at  approx 1 month ago for bronchitis with cough syrup and prednisone   She believes the prednisone may have caused a rash on her neck, chest.    The following portions of the patient's history were reviewed and updated as appropriate: allergies, current medications, past family history, past medical history, past social history, past surgical history and problem list.    Review of Systems   Constitutional: Positive for fatigue and fever (subjective). Negative for chills.   HENT: Positive for congestion, ear pain, postnasal drip, rhinorrhea, sinus pain and sore throat. Negative for mouth sores.    Eyes: Negative for pain, discharge and redness.   Respiratory: Positive for cough, shortness of breath and wheezing. Negative for hemoptysis.    Cardiovascular: Positive for chest pain (with cough).   Gastrointestinal: Negative for diarrhea, nausea and vomiting.   Genitourinary: Negative for dysuria and hematuria.   Musculoskeletal: Negative for myalgias.   Skin: Negative for rash (resolved).   Neurological: Positive for dizziness and headaches.   Hematological: Negative for adenopathy.   Psychiatric/Behavioral: Positive for sleep disturbance (due to cough). Negative for confusion.        Objective    Vitals:    02/20/19 1001   BP: 130/80   Pulse: 87   Temp: 98 °F (36.7 °C)   SpO2: 98%     Body mass index is 30.54 kg/m².      02/20/19  1001   Weight: 75.8 kg (167 lb)       Physical Exam   Constitutional: She is oriented to person, place, and time. She appears well-developed and well-nourished. She is cooperative. She appears ill. No distress.   HENT:   Head: Normocephalic and atraumatic.   Right Ear: External ear and ear canal normal. Tympanic membrane is injected and retracted.   Left Ear: External ear and ear canal normal. Tympanic membrane is injected and retracted.   Nose: Mucosal edema and rhinorrhea (mucoid) present.   Mouth/Throat: Mucous membranes are not dry. No oral lesions. Posterior oropharyngeal erythema present. No oropharyngeal exudate.   Mallampati class 3-4   Eyes: Conjunctivae and lids are normal.   Neck: Neck supple.   Cardiovascular: Normal rate, regular rhythm, normal heart sounds and intact distal pulses.   Pulmonary/Chest: Effort normal. No respiratory distress (paroxsymal cough on expiration). She has decreased breath sounds. She has no wheezes. She has no rhonchi. She has no rales.   Lymphadenopathy:     She has no cervical adenopathy.   Neurological: She is alert and oriented to person, place, and time.   Skin: Skin is warm and dry. No rash noted.   Psychiatric: She has a normal mood and affect. Her behavior is normal. Cognition and memory are normal.   Nursing note and vitals reviewed.    Recent Results (from the past 24 hour(s))   POC Influenza A / B    Collection Time: 02/20/19  1:24 PM   Result Value Ref Range    Rapid Influenza A Ag Negative Negative    Rapid Influenza B Ag Negative Negative    Internal Control Passed Passed    Lot Number [id number]     Expiration Date 12/6/2020    POC Rapid Strep A    Collection Time: 02/20/19  1:25 PM   Result Value Ref Range    Rapid Strep A Screen Negative Negative, VALID, INVALID, Not Performed    Internal Control Passed  Passed    Lot Number [id number]     Expiration Date 3/31/2020      Assessment/Plan   Vivien was seen today for cough, nasal congestion, earache, sore throat and fever.    Diagnoses and all orders for this visit:    Acute URI    Cough  -     POC Rapid Strep A  -     POC Influenza A / B    Fever, unspecified fever cause  -     POC Rapid Strep A  -     POC Influenza A / B    Sore throat  -     POC Rapid Strep A  -     POC Influenza A / B    Bronchospasm  -     methylPREDNISolone acetate (DEPO-medrol) injection 120 mg; Inject 1.5 mL into the appropriate muscle as directed by prescriber 1 (One) Time.    Snoring    Suspected sleep apnea    Other orders  -     doxycycline (ADOXA) 100 MG tablet; Take 1 tablet by mouth 2 (Two) Times a Day for 10 days.  -     promethazine-dextromethorphan (PROMETHAZINE-DM) 6.25-15 MG/5ML syrup; Take 5 mL by mouth 4 (Four) Times a Day As Needed for Cough for up to 5 days.  -     albuterol sulfate  (90 Base) MCG/ACT inhaler; Inhale 2 puffs Every 4 (Four) Hours As Needed for Wheezing or Shortness of Air.  -     benzonatate (TESSALON) 200 MG capsule; Take 1 capsule by mouth 3 (Three) Times a Day As Needed for Cough.      Patient treated for similar symptoms approximately a month ago but not on antibiotics at that time.  She has had an acute recurrence of symptoms within the past 4-5 days.  Rapid progression to paroxysmal cough, bronchospasm, productive sputum.  For that reason antibiotics as above, antitussives, albuterol MDI and 1 dose in Depo-Medrol as she did not tolerate prednisone well.    I also suspect she may have undiagnosed sleep apnea based on chronic fatigue, chronic loud snoring and Mallampati class 3-4 on exam.  Consider sleep study when she is well.    Patient was encouraged to keep me informed of any acute changes, lack of improvement, or any new concerning symptoms.  Pt is aware of reasons to seek emergent care.  Patient voiced understanding of all instructions and  denied further questions.        Please note that portions of this note may have been completed with a voice recognition program. Efforts were made to edit the dictations, but occasionally words are mistranscribed.

## 2019-02-25 ENCOUNTER — RESULTS ENCOUNTER (OUTPATIENT)
Dept: FAMILY MEDICINE CLINIC | Facility: CLINIC | Age: 39
End: 2019-02-25

## 2019-02-25 DIAGNOSIS — D75.839 THROMBOCYTOSIS: ICD-10-CM

## 2019-06-13 ENCOUNTER — CLINICAL SUPPORT (OUTPATIENT)
Dept: FAMILY MEDICINE CLINIC | Facility: CLINIC | Age: 39
End: 2019-06-13

## 2019-06-13 DIAGNOSIS — Z23 NEED FOR HEPATITIS A IMMUNIZATION: ICD-10-CM

## 2019-06-13 PROCEDURE — 90632 HEPA VACCINE ADULT IM: CPT | Performed by: FAMILY MEDICINE

## 2019-06-13 PROCEDURE — 90471 IMMUNIZATION ADMIN: CPT | Performed by: FAMILY MEDICINE

## 2019-09-18 ENCOUNTER — OFFICE VISIT (OUTPATIENT)
Dept: FAMILY MEDICINE CLINIC | Facility: CLINIC | Age: 39
End: 2019-09-18

## 2019-09-18 VITALS
HEART RATE: 79 BPM | SYSTOLIC BLOOD PRESSURE: 122 MMHG | DIASTOLIC BLOOD PRESSURE: 80 MMHG | WEIGHT: 169 LBS | OXYGEN SATURATION: 99 % | BODY MASS INDEX: 30.91 KG/M2

## 2019-09-18 DIAGNOSIS — Z23 NEED FOR INFLUENZA VACCINATION: ICD-10-CM

## 2019-09-18 DIAGNOSIS — Z00.00 WELL WOMAN EXAM WITHOUT GYNECOLOGICAL EXAM: Primary | ICD-10-CM

## 2019-09-18 DIAGNOSIS — Z13.1 SCREENING FOR DIABETES MELLITUS: ICD-10-CM

## 2019-09-18 DIAGNOSIS — Z12.31 ENCOUNTER FOR SCREENING MAMMOGRAM FOR BREAST CANCER: ICD-10-CM

## 2019-09-18 DIAGNOSIS — N60.11 FIBROCYSTIC BREAST CHANGES OF BOTH BREASTS: ICD-10-CM

## 2019-09-18 DIAGNOSIS — N60.12 FIBROCYSTIC BREAST CHANGES OF BOTH BREASTS: ICD-10-CM

## 2019-09-18 DIAGNOSIS — N92.6 IRREGULAR MENSES: ICD-10-CM

## 2019-09-18 DIAGNOSIS — Z13.220 SCREENING FOR LIPID DISORDERS: ICD-10-CM

## 2019-09-18 PROCEDURE — 90471 IMMUNIZATION ADMIN: CPT | Performed by: FAMILY MEDICINE

## 2019-09-18 PROCEDURE — 90674 CCIIV4 VAC NO PRSV 0.5 ML IM: CPT | Performed by: FAMILY MEDICINE

## 2019-09-18 PROCEDURE — 99396 PREV VISIT EST AGE 40-64: CPT | Performed by: FAMILY MEDICINE

## 2019-09-18 RX ORDER — NORGESTIMATE AND ETHINYL ESTRADIOL 7DAYSX3 LO
1 KIT ORAL DAILY
Qty: 28 TABLET | Refills: 11 | Status: SHIPPED | OUTPATIENT
Start: 2019-09-18 | End: 2020-07-21 | Stop reason: SDUPTHER

## 2019-09-18 NOTE — PROGRESS NOTES
Subjective   Vivien Rosa is a 39 y.o. female.     History of Present Illness  Mrs. Rosa presents today for annual check up.  Reproductive History  A0  Pregnancy complications (HTN, DM): none  Sexual Activity: current, male, monogamous   Contraception: s/p tubal ligation  Menses: irregular, getting heavier, more cramps, worsened moods during week prior. Also noticed assoc'd cystic acne recurrence (has upcoming apt with dermatology).   STD hx: denies  H/O abnormal pap: denies  Cervical procedures: none    Social Hx  Household members: self, , 2 boys  Marital status:   Tobacco use: no  EtOH use: no  Illicit drug use: no    Lifestyle  Diet: varied, healthy, excess calories  Exercise: irregular  Using seatbelt: yes  Sunscreen: yes  Mental Health: stable, no concerns  Feels safe in home: yes  Employed full time as     Screenings  Last pap:   Last breast exam: 2018  Last mammogram: never  Colonoscopy: n/a  DEXA: n/a  FLP: in past 2 years  BG/A1c:  In past 2 years  Vitamin D: unsure  Last dental check up: UTD  Last vision exam: UTD  Immunizations UTD: no Needs: influenza vaccine    The following portions of the patient's history were reviewed and updated as appropriate: allergies, current medications, past family history, past medical history, past social history, past surgical history and problem list.    Review of Systems   Constitutional: Negative for appetite change, fatigue, fever and unexpected weight change.   HENT: Negative for congestion, ear pain, hearing loss, nosebleeds, rhinorrhea, sneezing, sore throat and trouble swallowing.    Eyes: Negative for pain, discharge and redness.        Dry eyes   Respiratory: Negative for cough, shortness of breath and wheezing.    Cardiovascular: Negative for chest pain, palpitations and leg swelling.   Gastrointestinal: Negative for abdominal pain, blood in stool, constipation, diarrhea, nausea and vomiting.   Endocrine:  Negative for cold intolerance, heat intolerance, polydipsia and polyuria.   Genitourinary: Positive for menstrual problem. Negative for dysuria, flank pain, frequency, hematuria and urgency.   Musculoskeletal: Negative for arthralgias, back pain, joint swelling, myalgias and neck pain.   Skin: Negative for rash and wound.   Neurological: Positive for weakness (right wrist) and numbness (right wrist). Negative for dizziness, tremors, seizures, syncope, speech difficulty and headaches.   Hematological: Negative for adenopathy. Does not bruise/bleed easily.   Psychiatric/Behavioral: Negative for confusion, dysphoric mood and sleep disturbance. The patient is not nervous/anxious.    Pt's previous ROS reviewed and updated as indicated.       Objective    Vitals:    09/18/19 1328   BP: 122/80   Pulse: 79   SpO2: 99%     Body mass index is 30.91 kg/m².      09/18/19  1328   Weight: 76.7 kg (169 lb)       Physical Exam   Constitutional: She is oriented to person, place, and time. She appears well-developed and well-nourished. She is cooperative. She does not appear ill. No distress.   HENT:   Head: Normocephalic and atraumatic.   Right Ear: Tympanic membrane, external ear and ear canal normal.   Left Ear: Tympanic membrane, external ear and ear canal normal.   Nose: Nose normal.   Mouth/Throat: Oropharynx is clear and moist and mucous membranes are normal. Mucous membranes are not dry. No oral lesions. Normal dentition.   Eyes: Conjunctivae, EOM and lids are normal.   Neck: Phonation normal. Normal carotid pulses present. No thyromegaly present.   Cardiovascular: Normal rate, regular rhythm, normal heart sounds and intact distal pulses.   Pulmonary/Chest: Effort normal and breath sounds normal.   Abdominal: Soft. Bowel sounds are normal. She exhibits no distension and no mass. There is no hepatosplenomegaly. There is tenderness (mild) in the left lower quadrant. There is no rigidity, no rebound, no guarding and no CVA  tenderness.     Vascular Status -  Her right foot exhibits no edema. Her left foot exhibits no edema.  Lymphadenopathy:     She has no cervical adenopathy.   Neurological: She is alert and oriented to person, place, and time. She has normal strength. She displays no tremor. No cranial nerve deficit or sensory deficit. Gait normal.   Skin: Skin is warm and dry. No bruising and no rash noted.   Psychiatric: She has a normal mood and affect. Her behavior is normal.   Nursing note and vitals reviewed.      Assessment/Plan   Vivien was seen today for annual exam.    Diagnoses and all orders for this visit:    Well woman exam without gynecological exam  -     CBC (No Diff); Future  -     Comprehensive Metabolic Panel; Future  -     Lipid Panel; Future  -     Hemoglobin A1c; Future  -     TSH Rfx On Abnormal To Free T4; Future    Need for influenza vaccination  -     Flucelvax Quad=>4Years ([id number])    Irregular menses  -     TSH Rfx On Abnormal To Free T4; Future    Encounter for screening mammogram for breast cancer  -     Mammo Screening Digital Tomosynthesis Bilateral With CAD; Future    Fibrocystic breast changes of both breasts  -     Mammo Screening Digital Tomosynthesis Bilateral With CAD; Future    Screening for diabetes mellitus  -     Hemoglobin A1c; Future    Screening for lipid disorders  -     Lipid Panel; Future    Other orders  -     norgestimate-ethinyl estradiol (ORTHO TRI-CYCLEN LO) 0.18/0.215/0.25 MG-25 MCG per tablet; Take 1 tablet by mouth Daily.      Age appropriate preventive care reviewed including cancer screenings, safety measures, mental health concerns, supplements, prevention of CV disease and DM, etc. Handout provided.    I have reviewed risks/benefits and potential side effects of various hormonal and non-hormonal treatment options.  Patient voiced understanding and wishes to proceed with Ortho Tri Cyclen Lo. This will hopefully assist with acne, heavier menstrual flow, irregular cycles,  etc.    Routine f/u in 3 months to recheck BP, f/u sooner as needed/instructed.  I will contact patient regarding test results and provide instructions regarding any necessary changes in plan of care.  Patient was encouraged to keep me informed of any acute changes, lack of improvement, or any new concerning symptoms.  Pt is aware of reasons to seek emergent care.  Patient voiced understanding of all instructions and denied further questions.              Please note that portions of this note may have been completed with a voice recognition program. Efforts were made to edit the dictations, but occasionally words are mistranscribed.

## 2019-09-20 ENCOUNTER — RESULTS ENCOUNTER (OUTPATIENT)
Dept: FAMILY MEDICINE CLINIC | Facility: CLINIC | Age: 39
End: 2019-09-20

## 2019-09-20 DIAGNOSIS — Z13.220 SCREENING FOR LIPID DISORDERS: ICD-10-CM

## 2019-09-20 DIAGNOSIS — N92.6 IRREGULAR MENSES: ICD-10-CM

## 2019-09-20 DIAGNOSIS — Z13.1 SCREENING FOR DIABETES MELLITUS: ICD-10-CM

## 2019-09-20 DIAGNOSIS — Z00.00 WELL WOMAN EXAM WITHOUT GYNECOLOGICAL EXAM: ICD-10-CM

## 2019-10-09 ENCOUNTER — OFFICE VISIT (OUTPATIENT)
Dept: FAMILY MEDICINE CLINIC | Facility: CLINIC | Age: 39
End: 2019-10-09

## 2019-10-09 VITALS
BODY MASS INDEX: 31.15 KG/M2 | TEMPERATURE: 98.7 F | HEART RATE: 73 BPM | SYSTOLIC BLOOD PRESSURE: 108 MMHG | WEIGHT: 169.25 LBS | HEIGHT: 62 IN | OXYGEN SATURATION: 98 % | DIASTOLIC BLOOD PRESSURE: 68 MMHG

## 2019-10-09 DIAGNOSIS — J32.9 BACTERIAL SINUSITIS: Primary | ICD-10-CM

## 2019-10-09 DIAGNOSIS — B96.89 BACTERIAL SINUSITIS: Primary | ICD-10-CM

## 2019-10-09 PROCEDURE — 99213 OFFICE O/P EST LOW 20 MIN: CPT | Performed by: NURSE PRACTITIONER

## 2019-10-09 RX ORDER — MECLIZINE HCL 12.5 MG/1
12.5 TABLET ORAL 3 TIMES DAILY PRN
Qty: 30 TABLET | Refills: 0 | Status: SHIPPED | OUTPATIENT
Start: 2019-10-09 | End: 2020-06-09

## 2019-10-09 RX ORDER — AMOXICILLIN AND CLAVULANATE POTASSIUM 875; 125 MG/1; MG/1
1 TABLET, FILM COATED ORAL 2 TIMES DAILY
Qty: 20 TABLET | Refills: 0 | Status: SHIPPED | OUTPATIENT
Start: 2019-10-09 | End: 2019-10-19

## 2019-10-09 NOTE — PROGRESS NOTES
Subjective     Chief Complaint:    Chief Complaint   Patient presents with   • Earache     Patient c/o b/l ear pain, fullness, and dizziness that started three days ago. L>R   • Ear Fullness   • Dizziness   • Sinus Problem       History of Present Illness:   Saturday started with left maxillary sinus pressure that radiates to her left ear. She has been using a neti pot without much success. She is getting some vertigo symptoms. Has hx of vertigo. She has hx of seasonal allergies. + PND. + pressure behind her left eye. + left teeth pain. + subjective fever. + chills.     Review of Systems   Constitutional: Negative for chills and fever.   HENT: Positive for postnasal drip and rhinorrhea. Negative for sore throat.    Respiratory: Negative for cough and shortness of breath.    Cardiovascular: Negative for chest pain and palpitations.   Gastrointestinal: Positive for abdominal pain, diarrhea and nausea.   Neurological: Negative for dizziness and headache.   Psychiatric/Behavioral: Positive for sleep disturbance.        I have reviewed and/or updated the patient's past medical, surgical, family, social history and problem list as appropriate.     Medications:    Current Outpatient Medications:   •  albuterol sulfate  (90 Base) MCG/ACT inhaler, Inhale 2 puffs Every 4 (Four) Hours As Needed for Wheezing or Shortness of Air., Disp: 18 g, Rfl: 1  •  fluticasone (FLONASE) 50 MCG/ACT nasal spray, INT 1 SPRAY IN EACH NOSTRIL BID, Disp: , Rfl: 1  •  Multiple Vitamins-Minerals (ALIVE ONCE DAILY WOMENS PO), Take  by mouth., Disp: , Rfl:   •  norgestimate-ethinyl estradiol (ORTHO TRI-CYCLEN LO) 0.18/0.215/0.25 MG-25 MCG per tablet, Take 1 tablet by mouth Daily., Disp: 28 tablet, Rfl: 11  •  amoxicillin-clavulanate (AUGMENTIN) 875-125 MG per tablet, Take 1 tablet by mouth 2 (Two) Times a Day for 10 days., Disp: 20 tablet, Rfl: 0  •  meclizine (ANTIVERT) 12.5 MG tablet, Take 1 tablet by mouth 3 (Three) Times a Day As  "Needed for dizziness., Disp: 30 tablet, Rfl: 0    Allergies:  No Known Allergies    Objective     Vital Signs:   Vitals:    10/09/19 1014   BP: 108/68   Pulse: 73   Temp: 98.7 °F (37.1 °C)   TempSrc: Temporal   SpO2: 98%   Weight: 76.8 kg (169 lb 4 oz)   Height: 157.5 cm (62\")       Physical Exam:    Physical Exam   Constitutional: She is oriented to person, place, and time. She appears well-developed and well-nourished.   HENT:   Head: Normocephalic and atraumatic.   Right Ear: Tympanic membrane, external ear and ear canal normal.   Left Ear: Tympanic membrane, external ear and ear canal normal.   Nose: Right sinus exhibits maxillary sinus tenderness. Left sinus exhibits maxillary sinus tenderness.   Mouth/Throat: Uvula is midline, oropharynx is clear and moist and mucous membranes are normal.   Cardiovascular: Normal rate, regular rhythm and intact distal pulses. Exam reveals no gallop and no friction rub.   No murmur heard.  Pulmonary/Chest: Effort normal and breath sounds normal.   Abdominal: Soft. Bowel sounds are normal. There is no tenderness.   Lymphadenopathy:        Head (right side): No submental, no submandibular, no tonsillar, no preauricular and no posterior auricular adenopathy present.        Head (left side): No submental, no submandibular, no tonsillar, no preauricular and no posterior auricular adenopathy present.     She has no cervical adenopathy.   Neurological: She is alert and oriented to person, place, and time.   Skin: Skin is warm and dry.   Psychiatric: She has a normal mood and affect. Her behavior is normal.       Assessment / Plan     Assessment/Plan:   Problem List Items Addressed This Visit     None      Visit Diagnoses     Bacterial sinusitis    -  Primary    Relevant Medications    amoxicillin-clavulanate (AUGMENTIN) 875-125 MG per tablet          --Augmentin, finish all antibiotics.  Supportive care discussed    Follow up:  Return if symptoms worsen or fail to " improve.    Electronically signed by NELSON Padron   10/09/2019 10:28 AM      Please note that portions of this note may have been completed with a voice recognition program. Efforts were made to edit the dictations, but occasionally words are mistranscribed.

## 2019-11-13 DIAGNOSIS — J01.40 ACUTE NON-RECURRENT PANSINUSITIS: ICD-10-CM

## 2019-11-13 RX ORDER — FLUTICASONE PROPIONATE 50 MCG
SPRAY, SUSPENSION (ML) NASAL
Qty: 16 G | Refills: 0 | OUTPATIENT
Start: 2019-11-13

## 2019-11-19 ENCOUNTER — OFFICE VISIT (OUTPATIENT)
Dept: FAMILY MEDICINE CLINIC | Facility: CLINIC | Age: 39
End: 2019-11-19

## 2019-11-19 VITALS
HEART RATE: 75 BPM | SYSTOLIC BLOOD PRESSURE: 122 MMHG | BODY MASS INDEX: 31.1 KG/M2 | TEMPERATURE: 98 F | OXYGEN SATURATION: 99 % | DIASTOLIC BLOOD PRESSURE: 78 MMHG | WEIGHT: 169 LBS | HEIGHT: 62 IN

## 2019-11-19 DIAGNOSIS — J20.9 ACUTE BRONCHITIS, UNSPECIFIED ORGANISM: Primary | ICD-10-CM

## 2019-11-19 DIAGNOSIS — J06.9 ACUTE URI: ICD-10-CM

## 2019-11-19 PROCEDURE — 99213 OFFICE O/P EST LOW 20 MIN: CPT | Performed by: FAMILY MEDICINE

## 2019-11-19 RX ORDER — IPRATROPIUM BROMIDE 42 UG/1
2 SPRAY, METERED NASAL 4 TIMES DAILY
Qty: 15 ML | Refills: 0 | Status: SHIPPED | OUTPATIENT
Start: 2019-11-19 | End: 2019-11-23

## 2019-11-19 RX ORDER — ALBUTEROL SULFATE 90 UG/1
2 AEROSOL, METERED RESPIRATORY (INHALATION) EVERY 4 HOURS PRN
Qty: 18 G | Refills: 1 | Status: SHIPPED | OUTPATIENT
Start: 2019-11-19 | End: 2020-06-09

## 2019-11-19 RX ORDER — MINOCYCLINE HYDROCHLORIDE 100 MG/1
100 TABLET ORAL 2 TIMES DAILY
Refills: 2 | COMMUNITY
Start: 2019-10-18 | End: 2020-01-30

## 2019-11-19 NOTE — PROGRESS NOTES
Subjective   Vivien Rosa is a 39 y.o. female.     She c/o cough, congestion      Cough   This is a new problem. The current episode started in the past 7 days. The problem has been gradually worsening. The problem occurs every few minutes. The cough is productive of sputum. Associated symptoms include chest pain (mild, anterior with cough), ear congestion, headaches (mild), nasal congestion, postnasal drip, rhinorrhea (clear), a sore throat (mild) and wheezing. Pertinent negatives include no chills, ear pain, eye redness, fever, hemoptysis, myalgias, rash or shortness of breath. The symptoms are aggravated by lying down. She has tried OTC cough suppressant (OTC cough/cold meds, flonase, afrin) for the symptoms. The treatment provided mild relief. Her past medical history is significant for bronchitis.      The following portions of the patient's history were reviewed and updated as appropriate: allergies, current medications, past family history, past medical history, past social history, past surgical history and problem list.    Review of Systems   Constitutional: Positive for fatigue. Negative for chills and fever.   HENT: Positive for congestion, postnasal drip, rhinorrhea (clear) and sore throat (mild). Negative for ear pain, mouth sores and trouble swallowing.    Eyes: Negative for pain, discharge and redness.   Respiratory: Positive for cough and wheezing. Negative for hemoptysis and shortness of breath.    Cardiovascular: Positive for chest pain (mild, anterior with cough).   Gastrointestinal: Positive for nausea. Negative for diarrhea and vomiting.   Musculoskeletal: Negative for arthralgias and myalgias.   Skin: Negative for rash.   Neurological: Positive for weakness (generalized) and headaches (mild).   Hematological: Negative for adenopathy.   Psychiatric/Behavioral: Positive for sleep disturbance (due to cough).       Objective    Vitals:    11/19/19 1009   BP: 122/78   Pulse: 75   Temp: 98 °F  (36.7 °C)   SpO2: 99%     Body mass index is 30.91 kg/m².      11/19/19  1009   Weight: 76.7 kg (169 lb)       Physical Exam   Constitutional: She is oriented to person, place, and time. She appears well-developed and well-nourished. She is cooperative. She appears ill. No distress.   HENT:   Head: Normocephalic and atraumatic.   Right Ear: External ear and ear canal normal. Tympanic membrane is retracted. Tympanic membrane is not erythematous.   Left Ear: External ear and ear canal normal. Tympanic membrane is retracted. Tympanic membrane is not erythematous.   Nose: Mucosal edema and rhinorrhea (clear) present.   Mouth/Throat: Mucous membranes are not dry. No oral lesions. Posterior oropharyngeal erythema (mild) present. No oropharyngeal exudate.   Eyes: Conjunctivae and lids are normal.   Neck: Neck supple.   Cardiovascular: Normal rate, regular rhythm, normal heart sounds and intact distal pulses.   Pulmonary/Chest: Effort normal. No respiratory distress (deep breathing triggers paroxsymal cough). She has decreased breath sounds. She has no wheezes. She has no rhonchi. She has no rales.   Lymphadenopathy:     She has no cervical adenopathy.   Neurological: She is alert and oriented to person, place, and time.   Skin: Skin is warm and dry. No rash noted.   Psychiatric: She has a normal mood and affect. Her behavior is normal.   Nursing note and vitals reviewed.      Assessment/Plan   Vivien was seen today for nasal congestion and sinus problem.    Diagnoses and all orders for this visit:    Acute bronchitis, unspecified organism    Acute URI    Other orders  -     albuterol sulfate  (90 Base) MCG/ACT inhaler; Inhale 2 puffs Every 4 (Four) Hours As Needed for Wheezing or Shortness of Air.  -     ipratropium (ATROVENT) 0.06 % nasal spray; 2 sprays into the nostril(s) as directed by provider 4 (Four) Times a Day. FOR NOW USE INSTEAD OF FLONASE/AFRIN FOR RUNNY NOSE       Symptomatic management of acute  bronchitis/URI reviewed. Advised to d/c afrin. Can use atrovent nasal spray for rhinorrhea. Rest, fluids, promethazine DM as needed for cough (left over from previous rx).     F/u as needed.  I will contact patient regarding test results and provide instructions regarding any necessary changes in plan of care.  Patient was encouraged to keep me informed of any acute changes, lack of improvement, or any new concerning symptoms.  Pt is aware of reasons to seek emergent care.  Patient voiced understanding of all instructions and denied further questions.

## 2019-11-23 ENCOUNTER — OFFICE VISIT (OUTPATIENT)
Dept: RETAIL CLINIC | Facility: CLINIC | Age: 39
End: 2019-11-23

## 2019-11-23 VITALS
SYSTOLIC BLOOD PRESSURE: 140 MMHG | TEMPERATURE: 98.5 F | RESPIRATION RATE: 18 BRPM | BODY MASS INDEX: 30.91 KG/M2 | DIASTOLIC BLOOD PRESSURE: 82 MMHG | OXYGEN SATURATION: 99 % | HEART RATE: 107 BPM | WEIGHT: 169 LBS

## 2019-11-23 DIAGNOSIS — J01.00 ACUTE NON-RECURRENT MAXILLARY SINUSITIS: ICD-10-CM

## 2019-11-23 DIAGNOSIS — J20.9 ACUTE BRONCHITIS, UNSPECIFIED ORGANISM: Primary | ICD-10-CM

## 2019-11-23 PROCEDURE — 99213 OFFICE O/P EST LOW 20 MIN: CPT | Performed by: NURSE PRACTITIONER

## 2019-11-23 RX ORDER — CEFDINIR 300 MG/1
300 CAPSULE ORAL 2 TIMES DAILY
Qty: 20 CAPSULE | Refills: 0 | Status: SHIPPED | OUTPATIENT
Start: 2019-11-23 | End: 2019-12-03

## 2019-11-23 RX ORDER — METHYLPREDNISOLONE 4 MG/1
TABLET ORAL
Qty: 1 EACH | Refills: 0 | Status: SHIPPED | OUTPATIENT
Start: 2019-11-23 | End: 2020-01-30

## 2019-11-23 RX ORDER — BENZONATATE 200 MG/1
200 CAPSULE ORAL 3 TIMES DAILY PRN
Qty: 21 CAPSULE | Refills: 0 | Status: SHIPPED | OUTPATIENT
Start: 2019-11-23 | End: 2019-11-30

## 2019-11-23 NOTE — PATIENT INSTRUCTIONS
Sinusitis, Adult  Sinusitis is soreness and swelling (inflammation) of your sinuses. Sinuses are hollow spaces in the bones around your face. They are located:  · Around your eyes.  · In the middle of your forehead.  · Behind your nose.  · In your cheekbones.  Your sinuses and nasal passages are lined with a fluid called mucus. Mucus drains out of your sinuses. Swelling can trap mucus in your sinuses. This lets germs (bacteria, virus, or fungus) grow, which leads to infection. Most of the time, this condition is caused by a virus.  What are the causes?  This condition is caused by:  · Allergies.  · Asthma.  · Germs.  · Things that block your nose or sinuses.  · Growths in the nose (nasal polyps).  · Chemicals or irritants in the air.  · Fungus (rare).  What increases the risk?  You are more likely to develop this condition if:  · You have a weak body defense system (immune system).  · You do a lot of swimming or diving.  · You use nasal sprays too much.  · You smoke.  What are the signs or symptoms?  The main symptoms of this condition are pain and a feeling of pressure around the sinuses. Other symptoms include:  · Stuffy nose (congestion).  · Runny nose (drainage).  · Swelling and warmth in the sinuses.  · Headache.  · Toothache.  · A cough that may get worse at night.  · Mucus that collects in the throat or the back of the nose (postnasal drip).  · Being unable to smell and taste.  · Being very tired (fatigue).  · A fever.  · Sore throat.  · Bad breath.  How is this diagnosed?  This condition is diagnosed based on:  · Your symptoms.  · Your medical history.  · A physical exam.  · Tests to find out if your condition is short-term (acute) or long-term (chronic). Your doctor may:  ? Check your nose for growths (polyps).  ? Check your sinuses using a tool that has a light (endoscope).  ? Check for allergies or germs.  ? Do imaging tests, such as an MRI or CT scan.  How is this treated?  Treatment for this condition  depends on the cause and whether it is short-term or long-term.  · If caused by a virus, your symptoms should go away on their own within 10 days. You may be given medicines to relieve symptoms. They include:  ? Medicines that shrink swollen tissue in the nose.  ? Medicines that treat allergies (antihistamines).  ? A spray that treats swelling of the nostrils.   ? Rinses that help get rid of thick mucus in your nose (nasal saline washes).  · If caused by bacteria, your doctor may wait to see if you will get better without treatment. You may be given antibiotic medicine if you have:  ? A very bad infection.  ? A weak body defense system.  · If caused by growths in the nose, you may need to have surgery.  Follow these instructions at home:  Medicines  · Take, use, or apply over-the-counter and prescription medicines only as told by your doctor. These may include nasal sprays.  · If you were prescribed an antibiotic medicine, take it as told by your doctor. Do not stop taking the antibiotic even if you start to feel better.  Hydrate and humidify    · Drink enough water to keep your pee (urine) pale yellow.  · Use a cool mist humidifier to keep the humidity level in your home above 50%.  · Breathe in steam for 10-15 minutes, 3-4 times a day, or as told by your doctor. You can do this in the bathroom while a hot shower is running.  · Try not to spend time in cool or dry air.  Rest  · Rest as much as you can.  · Sleep with your head raised (elevated).  · Make sure you get enough sleep each night.  General instructions    · Put a warm, moist washcloth on your face 3-4 times a day, or as often as told by your doctor. This will help with discomfort.  · Wash your hands often with soap and water. If there is no soap and water, use hand .  · Do not smoke. Avoid being around people who are smoking (secondhand smoke).  · Keep all follow-up visits as told by your doctor. This is important.  Contact a doctor if:  · You  have a fever.  · Your symptoms get worse.  · Your symptoms do not get better within 10 days.  Get help right away if:  · You have a very bad headache.  · You cannot stop throwing up (vomiting).  · You have very bad pain or swelling around your face or eyes.  · You have trouble seeing.  · You feel confused.  · Your neck is stiff.  · You have trouble breathing.  Summary  · Sinusitis is swelling of your sinuses. Sinuses are hollow spaces in the bones around your face.  · This condition is caused by tissues in your nose that become inflamed or swollen. This traps germs. These can lead to infection.  · If you were prescribed an antibiotic medicine, take it as told by your doctor. Do not stop taking it even if you start to feel better.  · Keep all follow-up visits as told by your doctor. This is important.  This information is not intended to replace advice given to you by your health care provider. Make sure you discuss any questions you have with your health care provider.  Document Released: 06/05/2009 Document Revised: 05/20/2019 Document Reviewed: 05/20/2019  [employer] Interactive Patient Education © 2019 [employer]  Acute Bronchitis, Adult  Acute bronchitis is when air tubes (bronchi) in the lungs suddenly get swollen. The condition can make it hard to breathe. It can also cause these symptoms:  · A cough.  · Coughing up clear, yellow, or green mucus.  · Wheezing.  · Chest congestion.  · Shortness of breath.  · A fever.  · Body aches.  · Chills.  · A sore throat.  Follow these instructions at home:    Medicines  · Take over-the-counter and prescription medicines only as told by your doctor.  · If you were prescribed an antibiotic medicine, take it as told by your doctor. Do not stop taking the antibiotic even if you start to feel better.  General instructions  · Rest.  · Drink enough fluids to keep your pee (urine) pale yellow.  · Avoid smoking and secondhand smoke. If you smoke and you need help quitting, ask  your doctor. Quitting will help your lungs heal faster.  · Use an inhaler, cool mist vaporizer, or humidifier as told by your doctor.  · Keep all follow-up visits as told by your doctor. This is important.  How is this prevented?  To lower your risk of getting this condition again:  · Wash your hands often with soap and water. If you cannot use soap and water, use hand .  · Avoid contact with people who have cold symptoms.  · Try not to touch your hands to your mouth, nose, or eyes.  · Make sure to get the flu shot every year.  Contact a doctor if:  · Your symptoms do not get better in 2 weeks.  Get help right away if:  · You cough up blood.  · You have chest pain.  · You have very bad shortness of breath.  · You become dehydrated.  · You faint (pass out) or keep feeling like you are going to pass out.  · You keep throwing up (vomiting).  · You have a very bad headache.  · Your fever or chills gets worse.  This information is not intended to replace advice given to you by your health care provider. Make sure you discuss any questions you have with your health care provider.  Document Released: 06/05/2009 Document Revised: 08/01/2018 Document Reviewed: 06/07/2017  [employer] Interactive Patient Education © 2019 [employer]

## 2019-11-23 NOTE — PROGRESS NOTES
Cough      Subjective   Vivien Rosa is a 39 y.o. female.     Cough   This is a new problem. Episode onset: 1 week ago  The problem has been gradually worsening. The problem occurs every few minutes. The cough is non-productive. Associated symptoms include chills, a fever (maybe not sure ), headaches, myalgias, postnasal drip, rhinorrhea, a sore throat (initially ), shortness of breath and wheezing. Pertinent negatives include no ear pain or rash. The symptoms are aggravated by lying down, cold air and exercise. Treatments tried: Rest, Theraflu, Flonase, Inhaler, Cough syrup, Neti pot  The treatment provided mild relief.    Has had influenza vaccine.  Students at work ill.  See ROS.      Patient Active Problem List   Diagnosis   • Vertigo   • Carpal tunnel syndrome on right   • Fibrocystic breast changes of both breasts   • Dry eye syndrome, bilateral   • History of acute bronchitis with bronchospasm   • DDD (degenerative disc disease), cervical       No Known Allergies     Current Outpatient Medications on File Prior to Visit   Medication Sig Dispense Refill   • albuterol sulfate  (90 Base) MCG/ACT inhaler Inhale 2 puffs Every 4 (Four) Hours As Needed for Wheezing or Shortness of Air. 18 g 1   • fluticasone (FLONASE) 50 MCG/ACT nasal spray INT 1 SPRAY IN EACH NOSTRIL BID  1   • meclizine (ANTIVERT) 12.5 MG tablet Take 1 tablet by mouth 3 (Three) Times a Day As Needed for dizziness. 30 tablet 0   • minocycline (DYNACIN) 100 MG tablet Take 100 mg by mouth 2 (Two) Times a Day.  2   • norgestimate-ethinyl estradiol (ORTHO TRI-CYCLEN LO) 0.18/0.215/0.25 MG-25 MCG per tablet Take 1 tablet by mouth Daily. 28 tablet 11   • Multiple Vitamins-Minerals (ALIVE ONCE DAILY WOMENS PO) Take  by mouth.     • [DISCONTINUED] ipratropium (ATROVENT) 0.06 % nasal spray 2 sprays into the nostril(s) as directed by provider 4 (Four) Times a Day. FOR NOW USE INSTEAD OF FLONASE/AFRIN FOR RUNNY NOSE 15 mL 0     No current  facility-administered medications on file prior to visit.        Past Medical History:   Diagnosis Date   • Allergic    • Anemia    • Arm fracture    • Esophagitis    • History of blood transfusion     post-partum   • Sinusitis    • Vertigo        Past Surgical History:   Procedure Laterality Date   • HEMORRHOID BANDING     • MENG'S NEUROMA EXCISION Left    • TUBAL ABDOMINAL LIGATION     • VARICOSE VEIN SURGERY         Family History   Problem Relation Age of Onset   • Hypertension Mother    • Hypertension Brother    • Stroke Maternal Grandmother    • Heart disease Father    • Heart attack Father    • Thyroid disease Father    • Alcohol abuse Father    • Asperger's syndrome Sister        Social History     Socioeconomic History   • Marital status:      Spouse name: Not on file   • Number of children: Not on file   • Years of education: Not on file   • Highest education level: Not on file   Occupational History   • Occupation:    Tobacco Use   • Smoking status: Never Smoker   • Smokeless tobacco: Never Used   Substance and Sexual Activity   • Alcohol use: No   • Drug use: No   • Sexual activity: Yes     Partners: Male     Birth control/protection: Surgical, OCP       Travel:  No recent travel within the last 21 days outside the [place] Denies recent travel to one of the following [place]ries:  [place], [place], [place], or [place].  Denies contact with anyone who has traveled to one of the following [place]  Countries: [place], [place], [place], or [place] within the last 21 days and is known or suspected to have Ebola.  Denies having had any contact with the human remains, blood or any bodily fluids of someone who is known or suspected to have Ebola within the last 21 days.     OB History      Para Term  AB Living    2 2 2     2    SAB TAB Ectopic Molar Multiple Live Births              2          Review of Systems   Constitutional: Positive for chills,  diaphoresis, fatigue and fever (maybe not sure ).   HENT: Positive for congestion, dental problem (Initially ), postnasal drip, rhinorrhea, sinus pressure, sneezing (initially ), sore throat (initially ) and voice change. Negative for ear discharge, ear pain, mouth sores, nosebleeds and sinus pain.    Respiratory: Positive for cough, chest tightness, shortness of breath and wheezing.    Gastrointestinal: Positive for diarrhea (r/t PND ). Negative for abdominal pain, nausea and vomiting.   Musculoskeletal: Positive for myalgias.   Skin: Negative for rash.   Neurological: Positive for headaches. Negative for dizziness.       /82 (BP Location: Right arm, Patient Position: Sitting, Cuff Size: Adult)   Pulse 107   Temp 98.5 °F (36.9 °C) (Temporal)   Resp 18   Wt 76.7 kg (169 lb)   LMP 11/19/2019 (Exact Date)   SpO2 99%   Breastfeeding? No   BMI 30.91 kg/m²     Objective   Physical Exam   Constitutional: She is oriented to person, place, and time. She appears well-developed and well-nourished. She is cooperative. She appears ill (mild ). No distress.   HENT:   Head: Normocephalic.   Right Ear: External ear and ear canal normal. Tympanic membrane is not injected, not scarred, not perforated, not erythematous, not retracted and not bulging. A middle ear effusion (mild serous ) is present.   Left Ear: External ear and ear canal normal. Tympanic membrane is not injected, not scarred, not perforated, not erythematous, not retracted and not bulging. A middle ear effusion (mild serous ) is present.   Nose: Mucosal edema (boggy nasal turbinates ) and rhinorrhea (clear ) present. Right sinus exhibits maxillary sinus tenderness and frontal sinus tenderness. Left sinus exhibits maxillary sinus tenderness and frontal sinus tenderness.   Mouth/Throat: Uvula is midline. Mucous membranes are dry. No posterior oropharyngeal edema or posterior oropharyngeal erythema. Tonsils are 1+ on the right. Tonsils are 1+ on the left.  No tonsillar exudate.   Cardiovascular: Normal rate, regular rhythm and normal heart sounds.   Pulmonary/Chest: Effort normal. She has no decreased breath sounds. She has wheezes (trace inspiratory anterior left and right lobes ). She has no rhonchi. She has no rales.   Abdominal: Soft. Bowel sounds are normal. There is no tenderness.   Lymphadenopathy:        Head (right side): Tonsillar adenopathy present. No preauricular, no posterior auricular and no occipital adenopathy present.        Head (left side): Tonsillar adenopathy present. No preauricular, no posterior auricular and no occipital adenopathy present.     She has no cervical adenopathy.   Neurological: She is alert and oriented to person, place, and time.   Skin: Skin is warm, dry and intact. No rash noted.       Assessment/Plan   Vivien was seen today for cough.    Diagnoses and all orders for this visit:    Acute bronchitis, unspecified organism  -     methylPREDNISolone (MEDROL, EFREN,) 4 MG tablet; Take as directed on package instructions.  -     benzonatate (TESSALON) 200 MG capsule; Take 1 capsule by mouth 3 (Three) Times a Day As Needed for Cough for up to 7 days.    Acute non-recurrent maxillary sinusitis  -     cefdinir (OMNICEF) 300 MG capsule; Take 1 capsule by mouth 2 (Two) Times a Day for 10 days.      Rest, increase water intake, neti pot PRN, steam showers, warm facial compresses, humidifier in room.  Tylenol for pain/fever.  Follow up with PCP if symptoms worsen/do not improve.  Start a probiotic while taking the Antibiotic.  Patient verbalized understanding of instructions given.  Visit summary provided.  Questions/concerns addressed today.      Return if symptoms worsen or fail to improve with PCP.

## 2019-11-26 ENCOUNTER — APPOINTMENT (OUTPATIENT)
Dept: MAMMOGRAPHY | Facility: HOSPITAL | Age: 39
End: 2019-11-26

## 2020-01-24 ENCOUNTER — HOSPITAL ENCOUNTER (OUTPATIENT)
Dept: MAMMOGRAPHY | Facility: HOSPITAL | Age: 40
Discharge: HOME | End: 2020-01-24
Admitting: FAMILY MEDICINE

## 2020-01-24 DIAGNOSIS — N60.11 FIBROCYSTIC BREAST CHANGES OF BOTH BREASTS: ICD-10-CM

## 2020-01-24 DIAGNOSIS — Z12.31 ENCOUNTER FOR SCREENING MAMMOGRAM FOR BREAST CANCER: ICD-10-CM

## 2020-01-24 DIAGNOSIS — N60.12 FIBROCYSTIC BREAST CHANGES OF BOTH BREASTS: ICD-10-CM

## 2020-01-24 PROCEDURE — 77067 SCR MAMMO BI INCL CAD: CPT

## 2020-01-24 PROCEDURE — 77063 BREAST TOMOSYNTHESIS BI: CPT

## 2020-01-30 ENCOUNTER — OFFICE VISIT (OUTPATIENT)
Dept: RETAIL CLINIC | Facility: CLINIC | Age: 40
End: 2020-01-30

## 2020-01-30 VITALS
DIASTOLIC BLOOD PRESSURE: 74 MMHG | TEMPERATURE: 98 F | BODY MASS INDEX: 31.09 KG/M2 | OXYGEN SATURATION: 99 % | WEIGHT: 170 LBS | HEART RATE: 76 BPM | SYSTOLIC BLOOD PRESSURE: 112 MMHG | RESPIRATION RATE: 16 BRPM

## 2020-01-30 DIAGNOSIS — J11.1 INFLUENZA-LIKE ILLNESS: Primary | ICD-10-CM

## 2020-01-30 PROCEDURE — 87804 INFLUENZA ASSAY W/OPTIC: CPT | Performed by: NURSE PRACTITIONER

## 2020-01-30 PROCEDURE — 99213 OFFICE O/P EST LOW 20 MIN: CPT | Performed by: NURSE PRACTITIONER

## 2020-01-30 PROCEDURE — 87880 STREP A ASSAY W/OPTIC: CPT | Performed by: NURSE PRACTITIONER

## 2020-01-30 RX ORDER — OSELTAMIVIR PHOSPHATE 75 MG/1
75 CAPSULE ORAL 2 TIMES DAILY
Qty: 10 CAPSULE | Refills: 0 | Status: SHIPPED | OUTPATIENT
Start: 2020-01-30 | End: 2020-02-04

## 2020-01-30 RX ORDER — ISOTRETINOIN 40 MG/1
CAPSULE ORAL
COMMUNITY
Start: 2020-01-24 | End: 2020-06-09

## 2020-01-30 NOTE — PATIENT INSTRUCTIONS
"Influenza, Adult  Influenza, more commonly known as \"the flu,\" is a viral infection that mainly affects the respiratory tract. The respiratory tract includes organs that help you breathe, such as the lungs, nose, and throat. The flu causes many symptoms similar to the common cold along with high fever and body aches.  The flu spreads easily from person to person (is contagious). Getting a flu shot (influenza vaccination) every year is the best way to prevent the flu.  What are the causes?  This condition is caused by the influenza virus. You can get the virus by:  · Breathing in droplets that are in the air from an infected person's cough or sneeze.  · Touching something that has been exposed to the virus (has been contaminated) and then touching your mouth, nose, or eyes.  What increases the risk?  The following factors may make you more likely to get the flu:  · Not washing or sanitizing your hands often.  · Having close contact with many people during cold and flu season.  · Touching your mouth, eyes, or nose without first washing or sanitizing your hands.  · Not getting a yearly (annual) flu shot.  You may have a higher risk for the flu, including serious problems such as a lung infection (pneumonia), if you:  · Are older than 65.  · Are pregnant.  · Have a weakened disease-fighting system (immune system). You may have a weakened immune system if you:  ? Have HIV or AIDS.  ? Are undergoing chemotherapy.  ? Are taking medicines that reduce (suppress) the activity of your immune system.  · Have a long-term (chronic) illness, such as heart disease, kidney disease, diabetes, or lung disease.  · Have a liver disorder.  · Are severely overweight (morbidly obese).  · Have anemia. This is a condition that affects your red blood cells.  · Have asthma.  What are the signs or symptoms?  Symptoms of this condition usually begin suddenly and last 4-14 days. They may include:  · Fever and chills.  · Headaches, body aches, or " muscle aches.  · Sore throat.  · Cough.  · Runny or stuffy (congested) nose.  · Chest discomfort.  · Poor appetite.  · Weakness or fatigue.  · Dizziness.  · Nausea or vomiting.  How is this diagnosed?  This condition may be diagnosed based on:  · Your symptoms and medical history.  · A physical exam.  · Swabbing your nose or throat and testing the fluid for the influenza virus.  How is this treated?  If the flu is diagnosed early, you can be treated with medicine that can help reduce how severe the illness is and how long it lasts (antiviral medicine). This may be given by mouth (orally) or through an IV.  Taking care of yourself at home can help relieve symptoms. Your health care provider may recommend:  · Taking over-the-counter medicines.  · Drinking plenty of fluids.  In many cases, the flu goes away on its own. If you have severe symptoms or complications, you may be treated in a hospital.  Follow these instructions at home:  Activity  · Rest as needed and get plenty of sleep.  · Stay home from work or school as told by your health care provider. Unless you are visiting your health care provider, avoid leaving home until your fever has been gone for 24 hours without taking medicine.  Eating and drinking  · Take an oral rehydration solution (ORS). This is a drink that is sold at pharmacies and retail stores.  · Drink enough fluid to keep your urine pale yellow.  · Drink clear fluids in small amounts as you are able. Clear fluids include water, ice chips, diluted fruit juice, and low-calorie sports drinks.  · Eat bland, easy-to-digest foods in small amounts as you are able. These foods include bananas, applesauce, rice, lean meats, toast, and crackers.  · Avoid drinking fluids that contain a lot of sugar or caffeine, such as energy drinks, regular sports drinks, and soda.  · Avoid alcohol.  · Avoid spicy or fatty foods.  General instructions         · Take over-the-counter and prescription medicines only as told  "by your health care provider.  · Use a cool mist humidifier to add humidity to the air in your home. This can make it easier to breathe.  · Cover your mouth and nose when you cough or sneeze.  · Wash your hands with soap and water often, especially after you cough or sneeze. If soap and water are not available, use alcohol-based hand .  · Keep all follow-up visits as told by your health care provider. This is important.  How is this prevented?    · Get an annual flu shot. You may get the flu shot in late summer, fall, or winter. Ask your health care provider when you should get your flu shot.  · Avoid contact with people who are sick during cold and flu season. This is generally fall and winter.  Contact a health care provider if:  · You develop new symptoms.  · You have:  ? Chest pain.  ? Diarrhea.  ? A fever.  · Your cough gets worse.  · You produce more mucus.  · You feel nauseous or you vomit.  Get help right away if:  · You develop shortness of breath or difficulty breathing.  · Your skin or nails turn a bluish color.  · You have severe pain or stiffness in your neck.  · You develop a sudden headache or sudden pain in your face or ear.  · You cannot eat or drink without vomiting.  Summary  · Influenza, more commonly known as \"the flu,\" is a viral infection that primarily affects your respiratory tract.  · Symptoms of the flu usually begin suddenly and last 4-14 days.  · Getting an annual flu shot is the best way to prevent getting the flu.  · Stay home from work or school as told by your health care provider. Unless you are visiting your health care provider, avoid leaving home until your fever has been gone for 24 hours without taking medicine.  · Keep all follow-up visits as told by your health care provider. This is important.  This information is not intended to replace advice given to you by your health care provider. Make sure you discuss any questions you have with your health care " provider.  Document Released: 12/15/2001 Document Revised: 06/05/2019 Document Reviewed: 06/05/2019  [employer] Interactive Patient Education © 2019 [employer]

## 2020-01-30 NOTE — PROGRESS NOTES
Roman Rosa is a 39 y.o. female.     URI    This is a new problem. The current episode started yesterday. The problem has been unchanged. Maximum temperature: up to 99.8. Associated symptoms include coughing, diarrhea, ear pain, headaches, nausea, sinus pain, sneezing and a sore throat. Pertinent negatives include no chest pain, congestion, rhinorrhea or vomiting. Associated symptoms comments: sweats. Treatments tried: tylenol. The treatment provided mild relief.        Current Outpatient Medications on File Prior to Visit   Medication Sig Dispense Refill   • AMNESTEEM 40 MG capsule      • norgestimate-ethinyl estradiol (ORTHO TRI-CYCLEN LO) 0.18/0.215/0.25 MG-25 MCG per tablet Take 1 tablet by mouth Daily. 28 tablet 11   • albuterol sulfate  (90 Base) MCG/ACT inhaler Inhale 2 puffs Every 4 (Four) Hours As Needed for Wheezing or Shortness of Air. 18 g 1   • fluticasone (FLONASE) 50 MCG/ACT nasal spray INT 1 SPRAY IN EACH NOSTRIL BID  1   • meclizine (ANTIVERT) 12.5 MG tablet Take 1 tablet by mouth 3 (Three) Times a Day As Needed for dizziness. 30 tablet 0   • [DISCONTINUED] methylPREDNISolone (MEDROL, EFREN,) 4 MG tablet Take as directed on package instructions. 1 each 0   • [DISCONTINUED] minocycline (DYNACIN) 100 MG tablet Take 100 mg by mouth 2 (Two) Times a Day.  2   • [DISCONTINUED] Multiple Vitamins-Minerals (ALIVE ONCE DAILY WOMENS PO) Take  by mouth.       No current facility-administered medications on file prior to visit.        No Known Allergies    Past Medical History:   Diagnosis Date   • Allergic    • Anemia    • Arm fracture 1985   • Esophagitis    • History of blood transfusion 2014    post-partum   • Sinusitis    • Vertigo        Past Surgical History:   Procedure Laterality Date   • HEMORRHOID BANDING  2015   • MENG'S NEUROMA EXCISION Left 2009   • TUBAL ABDOMINAL LIGATION     • VARICOSE VEIN SURGERY         Family History   Problem Relation Age of Onset   • Hypertension  Mother    • Hypertension Brother    • Stroke Maternal Grandmother    • Heart disease Father    • Heart attack Father    • Thyroid disease Father    • Alcohol abuse Father    • Asperger's syndrome Sister        Social History     Socioeconomic History   • Marital status:      Spouse name: Not on file   • Number of children: Not on file   • Years of education: Not on file   • Highest education level: Not on file   Occupational History   • Occupation:    Tobacco Use   • Smoking status: Never Smoker   • Smokeless tobacco: Never Used   Substance and Sexual Activity   • Alcohol use: No   • Drug use: No   • Sexual activity: Yes     Partners: Male     Birth control/protection: Surgical, OCP       Review of Systems   Constitutional: Positive for activity change, appetite change and diaphoresis.   HENT: Positive for ear pain, sinus pain, sneezing and sore throat. Negative for congestion and rhinorrhea.    Respiratory: Positive for cough.    Cardiovascular: Negative for chest pain.   Gastrointestinal: Positive for diarrhea and nausea. Negative for vomiting.   Neurological: Positive for headaches.       /74   Pulse 76   Temp 98 °F (36.7 °C)   Resp 16   Wt 77.1 kg (170 lb)   LMP 01/12/2020   SpO2 99%   BMI 31.09 kg/m²     Objective   Physical Exam   Constitutional: She is oriented to person, place, and time. She appears well-developed and well-nourished. She is cooperative. She appears ill.   HENT:   Head: Normocephalic.   Right Ear: Tympanic membrane, external ear and ear canal normal.   Left Ear: Tympanic membrane, external ear and ear canal normal.   Nose: Right sinus exhibits no maxillary sinus tenderness and no frontal sinus tenderness. Left sinus exhibits no maxillary sinus tenderness and no frontal sinus tenderness.   Mouth/Throat: Uvula is midline, oropharynx is clear and moist and mucous membranes are normal. No tonsillar exudate.   Eyes: Conjunctivae, EOM and lids are normal.    Cardiovascular: Regular rhythm and normal heart sounds.   Pulmonary/Chest: Effort normal and breath sounds normal. No accessory muscle usage. No respiratory distress.   Lymphadenopathy:     She has no cervical adenopathy.   Neurological: She is alert and oriented to person, place, and time.   Skin: Skin is warm, dry and intact.   Psychiatric: She has a normal mood and affect. Her speech is normal and behavior is normal.         Assessment/Plan   Diagnoses and all orders for this visit:    Influenza-like illness  -     POCT rapid strep A  -     POCT Influenza A/B  -     oseltamivir (TAMIFLU) 75 MG capsule; Take 1 capsule by mouth 2 (Two) Times a Day for 5 days.        Results for orders placed or performed in visit on 01/30/20   POCT rapid strep A   Result Value Ref Range    Rapid Strep A Screen Negative Negative, VALID, INVALID, Not Performed    Internal Control Passed Passed    Lot Number [id number]     Expiration Date 3/21    POCT Influenza A/B   Result Value Ref Range    Rapid Influenza A Ag Negative Negative    Rapid Influenza B Ag Negative Negative    Internal Control Passed Passed    Lot Number [id number]     Expiration Date 6/21        Follow up with PCP or go to the nearest emergency room if symptoms worsen or fail to improve.

## 2020-06-09 ENCOUNTER — OFFICE VISIT (OUTPATIENT)
Dept: FAMILY MEDICINE CLINIC | Facility: CLINIC | Age: 40
End: 2020-06-09

## 2020-06-09 VITALS
TEMPERATURE: 98 F | HEART RATE: 88 BPM | WEIGHT: 168 LBS | DIASTOLIC BLOOD PRESSURE: 84 MMHG | OXYGEN SATURATION: 98 % | SYSTOLIC BLOOD PRESSURE: 126 MMHG | HEIGHT: 62 IN | BODY MASS INDEX: 30.91 KG/M2

## 2020-06-09 DIAGNOSIS — K92.1 BLOOD IN STOOL: ICD-10-CM

## 2020-06-09 DIAGNOSIS — R39.9 URINARY SYMPTOM OR SIGN: Primary | ICD-10-CM

## 2020-06-09 DIAGNOSIS — R10.9 LEFT FLANK PAIN: ICD-10-CM

## 2020-06-09 DIAGNOSIS — K62.89 RECTAL PAIN: ICD-10-CM

## 2020-06-09 DIAGNOSIS — E61.1 IRON DEFICIENCY: ICD-10-CM

## 2020-06-09 DIAGNOSIS — K64.9 HEMORRHOIDS, UNSPECIFIED HEMORRHOID TYPE: ICD-10-CM

## 2020-06-09 DIAGNOSIS — R10.32 LLQ PAIN: ICD-10-CM

## 2020-06-09 LAB
BILIRUB BLD-MCNC: NEGATIVE MG/DL
CLARITY, POC: CLEAR
COLOR UR: YELLOW
GLUCOSE UR STRIP-MCNC: NEGATIVE MG/DL
KETONES UR QL: NEGATIVE
LEUKOCYTE EST, POC: NEGATIVE
NITRITE UR-MCNC: NEGATIVE MG/ML
PH UR: 6 [PH] (ref 5–8)
PROT UR STRIP-MCNC: NEGATIVE MG/DL
RBC # UR STRIP: NEGATIVE /UL
SP GR UR: 1 (ref 1–1.03)
UROBILINOGEN UR QL: NORMAL

## 2020-06-09 PROCEDURE — 99214 OFFICE O/P EST MOD 30 MIN: CPT | Performed by: FAMILY MEDICINE

## 2020-06-09 PROCEDURE — 81003 URINALYSIS AUTO W/O SCOPE: CPT | Performed by: FAMILY MEDICINE

## 2020-06-09 RX ORDER — ISOTRETINOIN 30 MG/1
CAPSULE, LIQUID FILLED ORAL
COMMUNITY
Start: 2020-04-01 | End: 2020-06-09

## 2020-06-09 RX ORDER — CIPROFLOXACIN 500 MG/1
500 TABLET, FILM COATED ORAL 2 TIMES DAILY
Qty: 14 TABLET | Refills: 0 | Status: SHIPPED | OUTPATIENT
Start: 2020-06-09 | End: 2022-11-28 | Stop reason: SDUPTHER

## 2020-06-09 RX ORDER — ISOTRETINOIN 20 MG/1
CAPSULE, LIQUID FILLED ORAL
COMMUNITY
Start: 2020-06-08 | End: 2020-07-29

## 2020-06-09 RX ORDER — ISOTRETINOIN 40 MG/1
40 CAPSULE ORAL 2 TIMES DAILY
COMMUNITY
End: 2020-07-29

## 2020-06-09 RX ORDER — METRONIDAZOLE 500 MG/1
500 TABLET ORAL 3 TIMES DAILY
Qty: 21 TABLET | Refills: 0 | Status: SHIPPED | OUTPATIENT
Start: 2020-06-09 | End: 2022-11-28 | Stop reason: SDUPTHER

## 2020-06-09 NOTE — PROGRESS NOTES
Subjective   Vivien Rosa is a 40 y.o. female.     History of Present Illness  Mrs. Rosa presents today with c/o left lower abd pain, left side pain. Strong run down Friday, 3 days ago. Then began having pain on Saturday. Also increasing diarrhea. Has chronic issue with hemorrhoids. They have flared as well, become quite painful. She is having blood mixed in stool which is new (although some BRBPR common with her hemorrhoids). She has noticed increased reflux as well. Thought it was due to accutane, stopped accutane and has been off several days now. No personal h/o diverticulitis. + family hx. No fever, appetite has been decreased. Pain can be sharp with certain movements. At least moderate in severity, waxing and waning. No relieving factors noted. Seems to be worse after eating.    Menses 1 week ago.    Also c/o Inflamed labia due to recurrent diarrhea.     Has required hemorrhoid banding in past per Dr. Esparza.    Has had iron def related to blood loss in past.    The following portions of the patient's history were reviewed and updated as appropriate: allergies, current medications, past family history, past medical history, past social history, past surgical history and problem list.    Review of Systems   Constitutional: Positive for appetite change and fatigue. Negative for fever.   HENT: Negative for sore throat and trouble swallowing.    Respiratory: Negative for cough, shortness of breath and wheezing.    Cardiovascular: Negative for chest pain, palpitations and leg swelling.   Gastrointestinal: Positive for abdominal pain, blood in stool, diarrhea, nausea (mild) and rectal pain. Negative for vomiting.   Genitourinary: Positive for flank pain and frequency. Negative for dysuria, hematuria, menstrual problem, urgency and vaginal discharge.   Musculoskeletal: Positive for back pain.   Skin: Negative for rash.   Neurological: Negative for dizziness, syncope, weakness and headaches.   Hematological:  Negative for adenopathy. Does not bruise/bleed easily.   Psychiatric/Behavioral: Positive for sleep disturbance. Negative for confusion.       Objective    Vitals:    06/09/20 0825   BP: 126/84   Pulse: 88   Temp: 98 °F (36.7 °C)   SpO2: 98%     Body mass index is 30.73 kg/m².      06/09/20  0825   Weight: 76.2 kg (168 lb)       Physical Exam   Constitutional: She is oriented to person, place, and time. She appears well-developed and well-nourished. She is cooperative. She does not appear ill. She appears distressed (mildly due to hemorrhoid pain).   HENT:   Head: Normocephalic and atraumatic.   Mouth/Throat: Mucous membranes are normal. Mucous membranes are not dry.   Eyes: Conjunctivae are normal. No scleral icterus.   Cardiovascular: Normal rate, regular rhythm, normal heart sounds and intact distal pulses.   Pulmonary/Chest: Effort normal and breath sounds normal.   Abdominal: Soft. Bowel sounds are normal. She exhibits no distension and no mass. There is no hepatosplenomegaly. There is tenderness (moderate) in the left lower quadrant. There is no rigidity, no rebound, no guarding and no CVA tenderness.         Vascular Status -  Her right foot exhibits no edema. Her left foot exhibits no edema.  Neurological: She is alert and oriented to person, place, and time. Gait normal.   Skin: Skin is warm and dry. No rash noted.   No jaundice   Psychiatric: Her behavior is normal. Her mood appears anxious (mildly). Cognition and memory are normal.   Nursing note and vitals reviewed.    U/A normal    Assessment/Plan   Vivien was seen today for urinary frequency, flank pain and abdominal pain.    Diagnoses and all orders for this visit:    Urinary symptom or sign  -     POCT urinalysis dipstick, automated    LLQ pain  -     CBC & Differential  -     Comprehensive Metabolic Panel  -     C-reactive Protein  -     hCG, Serum, Qualitative    Left flank pain  -     CBC & Differential  -     Comprehensive Metabolic Panel  -      C-reactive Protein    Rectal pain  -     Ambulatory Referral to General Surgery    Hemorrhoids, unspecified hemorrhoid type  -     Ambulatory Referral to General Surgery    Blood in stool  -     CBC & Differential  -     Comprehensive Metabolic Panel  -     Iron  -     Ambulatory Referral to General Surgery    Iron deficiency  -     CBC & Differential  -     Iron    Other orders  -     metroNIDAZOLE (Flagyl) 500 MG tablet; Take 1 tablet by mouth 3 (Three) Times a Day for 7 days.  -     ciprofloxacin (Cipro) 500 MG tablet; Take 1 tablet by mouth 2 (Two) Times a Day for 7 days.      LLQ/left flank pain of unclear etiology but suspicious for diverticulitis. I have reviewed risks/benefits and potential side effects of various treatment options. Pt voices understanding and wishes to proceed with empiric tx with flagyl, cipro as well as lab eval as above with subsequent imaging as indicated based on lab results and/or clinical course (CT abd/pelvis with contrast).    We will try to get her in with general surgery ASAP for mgnt of hemorrhoids.    I will contact patient regarding test results and provide instructions regarding any necessary changes in plan of care.  Patient was encouraged to keep me informed of any acute changes, lack of improvement, or any new concerning symptoms.  Pt is aware of reasons to seek emergent care.  Patient voiced understanding of all instructions and denied further questions.

## 2020-06-10 DIAGNOSIS — R10.32 LLQ PAIN: Primary | ICD-10-CM

## 2020-06-10 DIAGNOSIS — K92.1 BLOOD IN STOOL: ICD-10-CM

## 2020-06-10 DIAGNOSIS — R63.0 ANOREXIA: ICD-10-CM

## 2020-06-10 DIAGNOSIS — R19.7 DIARRHEA, UNSPECIFIED TYPE: ICD-10-CM

## 2020-06-10 DIAGNOSIS — R10.9 LEFT FLANK PAIN: ICD-10-CM

## 2020-06-10 LAB
ALBUMIN SERPL-MCNC: 4.5 G/DL (ref 3.5–5.2)
ALBUMIN/GLOB SERPL: 1.4 G/DL
ALP SERPL-CCNC: 114 U/L (ref 39–117)
ALT SERPL-CCNC: 71 U/L (ref 1–33)
AST SERPL-CCNC: 45 U/L (ref 1–32)
BASOPHILS # BLD AUTO: 0.05 10*3/MM3 (ref 0–0.2)
BASOPHILS NFR BLD AUTO: 0.6 % (ref 0–1.5)
BILIRUB SERPL-MCNC: 0.3 MG/DL (ref 0.2–1.2)
BUN SERPL-MCNC: 8 MG/DL (ref 6–20)
BUN/CREAT SERPL: 10 (ref 7–25)
CALCIUM SERPL-MCNC: 9.9 MG/DL (ref 8.6–10.5)
CHLORIDE SERPL-SCNC: 104 MMOL/L (ref 98–107)
CO2 SERPL-SCNC: 22.5 MMOL/L (ref 22–29)
CREAT SERPL-MCNC: 0.8 MG/DL (ref 0.57–1)
CRP SERPL-MCNC: 0.95 MG/DL (ref 0–0.5)
EOSINOPHIL # BLD AUTO: 0.12 10*3/MM3 (ref 0–0.4)
EOSINOPHIL NFR BLD AUTO: 1.5 % (ref 0.3–6.2)
ERYTHROCYTE [DISTWIDTH] IN BLOOD BY AUTOMATED COUNT: 13.6 % (ref 12.3–15.4)
GLOBULIN SER CALC-MCNC: 3.2 GM/DL
GLUCOSE SERPL-MCNC: 103 MG/DL (ref 65–99)
HCT VFR BLD AUTO: 41.1 % (ref 34–46.6)
HGB BLD-MCNC: 13.3 G/DL (ref 12–15.9)
IMM GRANULOCYTES # BLD AUTO: 0.02 10*3/MM3 (ref 0–0.05)
IMM GRANULOCYTES NFR BLD AUTO: 0.3 % (ref 0–0.5)
IRON SERPL-MCNC: 91 MCG/DL (ref 37–145)
LYMPHOCYTES # BLD AUTO: 2.22 10*3/MM3 (ref 0.7–3.1)
LYMPHOCYTES NFR BLD AUTO: 28.1 % (ref 19.6–45.3)
MCH RBC QN AUTO: 28.4 PG (ref 26.6–33)
MCHC RBC AUTO-ENTMCNC: 32.4 G/DL (ref 31.5–35.7)
MCV RBC AUTO: 87.6 FL (ref 79–97)
MONOCYTES # BLD AUTO: 0.54 10*3/MM3 (ref 0.1–0.9)
MONOCYTES NFR BLD AUTO: 6.8 % (ref 5–12)
NEUTROPHILS # BLD AUTO: 4.95 10*3/MM3 (ref 1.7–7)
NEUTROPHILS NFR BLD AUTO: 62.7 % (ref 42.7–76)
NRBC BLD AUTO-RTO: 0 /100 WBC (ref 0–0.2)
PLATELET # BLD AUTO: 450 10*3/MM3 (ref 140–450)
POTASSIUM SERPL-SCNC: 4.4 MMOL/L (ref 3.5–5.2)
PROT SERPL-MCNC: 7.7 G/DL (ref 6–8.5)
RBC # BLD AUTO: 4.69 10*6/MM3 (ref 3.77–5.28)
SODIUM SERPL-SCNC: 137 MMOL/L (ref 136–145)
WBC # BLD AUTO: 7.9 10*3/MM3 (ref 3.4–10.8)

## 2020-06-10 NOTE — PROGRESS NOTES
Please inform pt her labs were normal other mildly elevated liver enzymes & inflammatory markers. How is she feeling?

## 2020-06-11 LAB
B-HCG SERPL QL: NEGATIVE
Lab: NORMAL
WRITTEN AUTHORIZATION: NORMAL

## 2020-06-15 ENCOUNTER — HOSPITAL ENCOUNTER (OUTPATIENT)
Dept: CT IMAGING | Facility: HOSPITAL | Age: 40
Discharge: HOME | End: 2020-06-15
Admitting: FAMILY MEDICINE

## 2020-06-15 DIAGNOSIS — R63.0 ANOREXIA: ICD-10-CM

## 2020-06-15 DIAGNOSIS — R19.7 DIARRHEA, UNSPECIFIED TYPE: ICD-10-CM

## 2020-06-15 DIAGNOSIS — R10.9 LEFT FLANK PAIN: ICD-10-CM

## 2020-06-15 DIAGNOSIS — K92.1 BLOOD IN STOOL: ICD-10-CM

## 2020-06-15 DIAGNOSIS — R10.32 LLQ PAIN: ICD-10-CM

## 2020-06-15 PROCEDURE — 74178 CT ABD&PLV WO CNTR FLWD CNTR: CPT

## 2020-06-15 PROCEDURE — 0 DIATRIZOATE MEGLUMINE & SODIUM PER 1 ML: Performed by: FAMILY MEDICINE

## 2020-06-15 PROCEDURE — 25010000002 IOPAMIDOL 61 % SOLUTION: Performed by: FAMILY MEDICINE

## 2020-06-15 RX ADMIN — DIATRIZOATE MEGLUMINE AND DIATRIZOATE SODIUM 30 ML: 660; 100 LIQUID ORAL; RECTAL at 18:20

## 2020-06-15 RX ADMIN — IOPAMIDOL 100 ML: 612 INJECTION, SOLUTION INTRAVENOUS at 18:08

## 2020-06-19 NOTE — PROGRESS NOTES
Patient: Vivien Rosa    YOB: 1980    Date: 06/24/2020    Primary Care Provider: Katiana Sandoval MD    Chief Complaint   Patient presents with   • Rectal Bleeding       SUBJECTIVE:    History of present illness:  I saw the patient in the office today as a consultation for evaluation and treatment of blood in stool for years. She has hemorrhoid banding twice. She has been having hemorrhoids problems for the last two weeks. She complains of a recent bout of diarrhea due recurrent diverticulitis.  Patient concerned about persistent prolapse and bleeding from hemorrhoids.  That excess amount of tissue on the outside and inside and she would like to have them removed.    The following portions of the patient's history were reviewed and updated as appropriate: allergies, current medications, past family history, past medical history, past social history, past surgical history and problem list.    Review of Systems   Constitutional: Negative for chills, fever and unexpected weight change.   HENT: Negative for hearing loss, trouble swallowing and voice change.    Eyes: Negative for visual disturbance.   Respiratory: Negative for apnea, cough, chest tightness, shortness of breath and wheezing.    Cardiovascular: Negative for chest pain, palpitations and leg swelling.   Gastrointestinal: Positive for abdominal pain, anal bleeding and diarrhea. Negative for abdominal distention, blood in stool, constipation, nausea, rectal pain and vomiting.   Endocrine: Negative for cold intolerance and heat intolerance.   Genitourinary: Negative for difficulty urinating, dysuria and flank pain.   Musculoskeletal: Negative for back pain and gait problem.   Skin: Negative for color change, rash and wound.   Neurological: Negative for dizziness, syncope, speech difficulty, weakness, light-headedness, numbness and headaches.   Hematological: Negative for adenopathy. Does not bruise/bleed easily.   Psychiatric/Behavioral:  "Negative for confusion. The patient is not nervous/anxious.        History:  Past Medical History:   Diagnosis Date   • Allergic    • Anemia    • Arm fracture 1985   • Esophagitis    • History of blood transfusion 2014    post-partum   • Sinusitis    • Vertigo        Past Surgical History:   Procedure Laterality Date   • HEMORRHOID BANDING  2015   • MENG'S NEUROMA EXCISION Left 2009   • TUBAL ABDOMINAL LIGATION     • VARICOSE VEIN SURGERY         Family History   Problem Relation Age of Onset   • Hypertension Mother    • Hypertension Brother    • Stroke Maternal Grandmother    • Heart disease Father    • Heart attack Father    • Thyroid disease Father    • Alcohol abuse Father    • Asperger's syndrome Sister        Social History     Tobacco Use   • Smoking status: Never Smoker   • Smokeless tobacco: Never Used   Substance Use Topics   • Alcohol use: No   • Drug use: No       Allergies:  No Known Allergies    Medications:    Current Outpatient Medications:   •  norgestimate-ethinyl estradiol (ORTHO TRI-CYCLEN LO) 0.18/0.215/0.25 MG-25 MCG per tablet, Take 1 tablet by mouth Daily., Disp: 28 tablet, Rfl: 11  •  ISOtretinoin (Myorisan) 40 MG capsule, Take 40 mg by mouth 2 (Two) Times a Day., Disp: , Rfl:   •  MYORISAN 20 MG capsule, , Disp: , Rfl:     OBJECTIVE:    Vital Signs:   Vitals:    06/24/20 0922   BP: 150/98   Pulse: 91   Temp: 97.7 °F (36.5 °C)   SpO2: 99%   Weight: 76.3 kg (168 lb 3.2 oz)   Height: 157.5 cm (62\")       Physical Exam:   General Appearance:    Alert, cooperative, in no acute distress   Head:    Normocephalic, without obvious abnormality, atraumatic   Eyes:            Lids and lashes normal, conjunctivae and sclerae normal, no   icterus, no pallor, corneas clear, PERRL   Ears:    Ears appear intact with no abnormalities noted   Throat:   No oral lesions, no thrush, oral mucosa moist   Neck:   No adenopathy, supple, trachea midline, no thyromegaly,  no JVD   Lungs:     Clear to " auscultation,respirations regular, even and                  unlabored    Heart:    Regular rhythm and normal rate, normal S1 and S2, no            murmur   Abdomen:     no masses, no organomegaly, soft non-tender, non-distended, no guarding, there is no evidence of tenderness  Rectal- large prolapsing internal and external hemorrhoids with evidence of bleeding.   Extremities:   Moves all extremities well, no edema, no cyanosis, no             redness   Pulses:   Pulses palpable and equal bilaterally   Skin:   No bleeding, bruising or rash   Lymph nodes:   No palpable adenopathy   Neurologic:   Cranial nerves 2 - 12 grossly intact, sensation intact      Results Review:   I reviewed the patient's new clinical results.    Review of Systems was reviewed and confirmed as accurate as documented by the MA.    ASSESSMENT/PLAN:    1. Rectal bleed    2. Internal and external prolapsed hemorrhoids        I recommend hemorrhoidectomy in 2 stages.  I would like to take the left half and right half at different times.  Patient has extensive hemorrhoid tissue with internal and external components.  Risk of bleeding infection and recurrence discussed and patient agreeable.  Also discussed possibility of fecal incontinence temporarily and she is agreeable.. The procedure was explained as well as the risks which include but are not limited to bleeding, infection, perforation, abdominal pain etc. The patient understands these risks and the procedure and wishes to proceed.     Electronically signed by Huong Esparza MD  06/24/20 9:26 AM

## 2020-06-24 ENCOUNTER — OFFICE VISIT (OUTPATIENT)
Dept: SURGERY | Facility: CLINIC | Age: 40
End: 2020-06-24

## 2020-06-24 VITALS
WEIGHT: 168.2 LBS | OXYGEN SATURATION: 99 % | BODY MASS INDEX: 30.95 KG/M2 | SYSTOLIC BLOOD PRESSURE: 150 MMHG | DIASTOLIC BLOOD PRESSURE: 98 MMHG | HEIGHT: 62 IN | HEART RATE: 91 BPM | TEMPERATURE: 97.7 F

## 2020-06-24 DIAGNOSIS — K62.5 RECTAL BLEED: Primary | ICD-10-CM

## 2020-06-24 DIAGNOSIS — K64.8 INTERNAL AND EXTERNAL PROLAPSED HEMORRHOIDS: ICD-10-CM

## 2020-06-24 PROCEDURE — 99213 OFFICE O/P EST LOW 20 MIN: CPT | Performed by: SURGERY

## 2020-06-26 ENCOUNTER — HOSPITAL ENCOUNTER (EMERGENCY)
Facility: HOSPITAL | Age: 40
End: 2020-06-26

## 2020-06-26 ENCOUNTER — TRANSCRIBE ORDERS (OUTPATIENT)
Dept: LAB | Facility: HOSPITAL | Age: 40
End: 2020-06-26

## 2020-06-26 ENCOUNTER — LAB (OUTPATIENT)
Dept: LAB | Facility: HOSPITAL | Age: 40
End: 2020-06-26

## 2020-06-26 ENCOUNTER — RESULTS ENCOUNTER (OUTPATIENT)
Dept: SURGERY | Facility: CLINIC | Age: 40
End: 2020-06-26

## 2020-06-26 DIAGNOSIS — Z01.818 PRE-OP TESTING: Primary | ICD-10-CM

## 2020-06-26 DIAGNOSIS — Z01.818 PRE-OP TESTING: ICD-10-CM

## 2020-06-26 PROCEDURE — C9803 HOPD COVID-19 SPEC COLLECT: HCPCS

## 2020-06-26 PROCEDURE — U0002 COVID-19 LAB TEST NON-CDC: HCPCS

## 2020-06-26 PROCEDURE — U0004 COV-19 TEST NON-CDC HGH THRU: HCPCS

## 2020-06-27 LAB
REF LAB TEST METHOD: NORMAL
SARS-COV-2 RNA RESP QL NAA+PROBE: NOT DETECTED

## 2020-06-29 ENCOUNTER — TELEPHONE (OUTPATIENT)
Dept: SURGERY | Facility: CLINIC | Age: 40
End: 2020-06-29

## 2020-06-30 ENCOUNTER — OUTSIDE FACILITY SERVICE (OUTPATIENT)
Dept: SURGERY | Facility: CLINIC | Age: 40
End: 2020-06-30

## 2020-06-30 PROCEDURE — 46260 REMOVE IN/EX HEM GROUPS 2+: CPT | Performed by: SURGERY

## 2020-07-08 NOTE — PROGRESS NOTES
"Patient: Vivien Rosa  YOB: 1980    Date: 07/15/2020    Primary Care Provider: Katiana Sandoval MD    Chief Complaint   Patient presents with   • Post-op Follow-up     internal and external hemorrhoidectomy.       History:I saw the patient in the office today for a follow up hemorrhoidectomy which was done on 06/30/2020.  Patient stated \"I feel way better that I did but I am still having some pain.\"    The following portions of the patient's history were reviewed and updated as appropriate: allergies, current medications, past family history, past medical history, past social history, past surgical history and problem list.    Review of Systems   Constitutional: Negative for chills, fever and unexpected weight change.   HENT: Negative for hearing loss, trouble swallowing and voice change.    Eyes: Negative for visual disturbance.   Respiratory: Negative for apnea, cough, chest tightness, shortness of breath and wheezing.    Cardiovascular: Negative for chest pain, palpitations and leg swelling.   Gastrointestinal: Negative for abdominal distention, abdominal pain, anal bleeding, blood in stool, constipation, diarrhea, nausea, rectal pain and vomiting.   Endocrine: Negative for cold intolerance and heat intolerance.   Genitourinary: Negative for difficulty urinating, dysuria and flank pain.   Musculoskeletal: Negative for back pain and gait problem.   Skin: Negative for color change, rash and wound.   Neurological: Negative for dizziness, syncope, speech difficulty, weakness, light-headedness, numbness and headaches.   Hematological: Negative for adenopathy. Does not bruise/bleed easily.   Psychiatric/Behavioral: Negative for confusion. The patient is not nervous/anxious.        Vital Signs  Vitals:    07/15/20 1010   BP: 120/80   Pulse: 76   Resp: 16   Temp: 98 °F (36.7 °C)   TempSrc: Temporal   SpO2: 99%   Weight: 76.2 kg (168 lb)   Height: 157.5 cm (62\")       Allergies:  No Known " Allergies    Medications:    Current Outpatient Medications:   •  ISOtretinoin (Myorisan) 40 MG capsule, Take 40 mg by mouth 2 (Two) Times a Day., Disp: , Rfl:   •  MYORISAN 20 MG capsule, , Disp: , Rfl:   •  norgestimate-ethinyl estradiol (ORTHO TRI-CYCLEN LO) 0.18/0.215/0.25 MG-25 MCG per tablet, Take 1 tablet by mouth Daily., Disp: 28 tablet, Rfl: 11    Physical Exam:   General Appearance:    Alert, cooperative, in no acute distress   Head:    Normocephalic, without obvious abnormality, atraumatic   Lungs:     Clear to auscultation,respirations regular, even and                  unlabored    Heart:    Regular rhythm and normal rate, normal S1 and S2, no            murmur, no gallop, no rub, no click   Abdomen:     Normal bowel sounds, no masses, no organomegaly, soft        non-tender, non-distended, no guarding, no rebound                tenderness   Extremities:   Moves all extremities well, no edema, no cyanosis, no             redness   Pulses:   Pulses palpable and equal bilaterally   Skin:   No bleeding, bruising or rash     Results Review:   I reviewed the patient's new clinical results.     Review of Systems was reviewed and confirmed as accurate as documented by the MA.    ASSESSMENT/PLAN:    1. Postoperative visit    Follow-up in 3 months for reevaluation.  Patient may require second hemorrhoid surgery for 2 more columns if symptoms recur.    Electronically signed by Huong Esparza MD  07/15/20

## 2020-07-15 ENCOUNTER — TELEPHONE (OUTPATIENT)
Dept: FAMILY MEDICINE CLINIC | Facility: CLINIC | Age: 40
End: 2020-07-15

## 2020-07-15 ENCOUNTER — OFFICE VISIT (OUTPATIENT)
Dept: SURGERY | Facility: CLINIC | Age: 40
End: 2020-07-15

## 2020-07-15 VITALS
OXYGEN SATURATION: 99 % | HEIGHT: 62 IN | HEART RATE: 76 BPM | BODY MASS INDEX: 30.91 KG/M2 | SYSTOLIC BLOOD PRESSURE: 120 MMHG | WEIGHT: 168 LBS | TEMPERATURE: 98 F | RESPIRATION RATE: 16 BRPM | DIASTOLIC BLOOD PRESSURE: 80 MMHG

## 2020-07-15 DIAGNOSIS — Z48.89 POSTOPERATIVE VISIT: Primary | ICD-10-CM

## 2020-07-15 PROCEDURE — 99024 POSTOP FOLLOW-UP VISIT: CPT | Performed by: SURGERY

## 2020-07-21 RX ORDER — NORGESTIMATE AND ETHINYL ESTRADIOL 7DAYSX3 LO
1 KIT ORAL DAILY
Qty: 28 TABLET | Refills: 11 | Status: SHIPPED | OUTPATIENT
Start: 2020-07-21 | End: 2021-06-09

## 2020-07-21 NOTE — TELEPHONE ENCOUNTER
,  Caller: Vivien Rosa    Relationship: Self    Best call back number: [phone]    Medication needed:   Requested Prescriptions     Pending Prescriptions Disp Refills   • norgestimate-ethinyl estradiol (Ortho Tri-Cyclen Lo) 0.18/0.215/0.25 MG-25 MCG per tablet 28 tablet 11     Sig: Take 1 tablet by mouth Daily.       When do you need the refill by: asap    What details did the patient provide when requesting the medication: Patient is out of the medication    Does the patient have less than a 3 day supply:  [x] Yes  [] No    What is the patient's preferred pharmacy: [id number] - [place], [place] - [address] - [phone] [facility] [phone] FX

## 2020-07-29 ENCOUNTER — OFFICE VISIT (OUTPATIENT)
Dept: SURGERY | Facility: CLINIC | Age: 40
End: 2020-07-29

## 2020-07-29 VITALS
WEIGHT: 167.99 LBS | BODY MASS INDEX: 30.91 KG/M2 | HEART RATE: 105 BPM | DIASTOLIC BLOOD PRESSURE: 90 MMHG | HEIGHT: 62 IN | SYSTOLIC BLOOD PRESSURE: 130 MMHG | TEMPERATURE: 98 F | OXYGEN SATURATION: 99 %

## 2020-07-29 DIAGNOSIS — Z48.89 POSTOPERATIVE VISIT: Primary | ICD-10-CM

## 2020-07-29 PROCEDURE — 99024 POSTOP FOLLOW-UP VISIT: CPT | Performed by: SURGERY

## 2020-07-29 NOTE — PROGRESS NOTES
"Patient: Vivien Rosa  YOB: 1980    Date: 07/29/2020    Primary Care Provider: Katiana Sandoval MD    Chief Complaint   Patient presents with   • Post-op     hemorrhoidectomy       History: Patient is here for an evaluation following her recent hemorrhoidectomy. She complains of a string hanging from a skin tag causing discomfort.    The following portions of the patient's history were reviewed and updated as appropriate: allergies, current medications, past family history, past medical history, past social history, past surgical history and problem list.    Review of Systems   Constitutional: Negative for chills, fever and unexpected weight change.   HENT: Negative for hearing loss, trouble swallowing and voice change.    Eyes: Negative for visual disturbance.   Respiratory: Negative for apnea, cough, chest tightness, shortness of breath and wheezing.    Cardiovascular: Negative for chest pain, palpitations and leg swelling.   Gastrointestinal: Negative for abdominal distention, abdominal pain, anal bleeding, blood in stool, constipation, diarrhea, nausea, rectal pain and vomiting.   Endocrine: Negative for cold intolerance and heat intolerance.   Genitourinary: Negative for difficulty urinating, dysuria and flank pain.   Musculoskeletal: Negative for back pain and gait problem.   Skin: Negative for color change, rash and wound.   Neurological: Negative for dizziness, syncope, speech difficulty, weakness, light-headedness, numbness and headaches.   Hematological: Negative for adenopathy. Does not bruise/bleed easily.   Psychiatric/Behavioral: Negative for confusion. The patient is not nervous/anxious.        Vital Signs  Vitals:    07/29/20 1014   BP: 130/90   Pulse: 105   Temp: 98 °F (36.7 °C)   SpO2: 99%   Weight: 76.2 kg (167 lb 15.9 oz)   Height: 157.5 cm (62.01\")       Allergies:  No Known Allergies    Medications:    Current Outpatient Medications:   •  norgestimate-ethinyl estradiol (Ortho " Tri-Cyclen Lo) 0.18/0.215/0.25 MG-25 MCG per tablet, Take 1 tablet by mouth Daily., Disp: 28 tablet, Rfl: 11    Physical Exam:   General Appearance:    Alert, cooperative, in no acute distress   Head:    Normocephalic, without obvious abnormality, atraumatic   Lungs:     Clear to auscultation,respirations regular, even and                  unlabored    Heart:    Regular rhythm and normal rate, normal S1 and S2, no            murmur, no gallop, no rub, no click   Abdomen:     Normal bowel sounds, no masses, no organomegaly, soft        non-tender, non-distended, no guarding, no rebound                tenderness   Extremities:   Moves all extremities well, no edema, no cyanosis, no             redness   Pulses:   Pulses palpable and equal bilaterally   Skin:   No bleeding, bruising or rash     Results Review:   I reviewed the patient's new clinical results.     Review of Systems was reviewed and confirmed as accurate as documented by the MA.    ASSESSMENT/PLAN:    1. Postoperative visit    Patient reassured, sutures removed.  Continue sitz bath and mineral daily.  Follow-up in 6 weeks if patient has further issues with hemorrhoids.    Electronically signed by Huong Esparza MD  07/29/20    Portions of this note has been scribed for Huong Esparza MD by Jennifer Swift. 7/29/2020  10:35

## 2020-09-17 ENCOUNTER — FLU SHOT (OUTPATIENT)
Dept: FAMILY MEDICINE CLINIC | Facility: CLINIC | Age: 40
End: 2020-09-17

## 2020-09-17 DIAGNOSIS — Z23 NEED FOR INFLUENZA VACCINATION: Primary | ICD-10-CM

## 2020-09-17 PROCEDURE — 90471 IMMUNIZATION ADMIN: CPT | Performed by: FAMILY MEDICINE

## 2020-09-17 PROCEDURE — 90686 IIV4 VACC NO PRSV 0.5 ML IM: CPT | Performed by: FAMILY MEDICINE

## 2020-10-14 NOTE — PROGRESS NOTES
Patient: Vivien Rosa  YOB: 1980    Date: 10/16/2020    Primary Care Provider: Katiana Sandoval MD    Chief Complaint   Patient presents with   • Follow-up     hemorrhoids doing much better       History: Patient is in the office today for a 3 month follow up on Internal and external prolapsed hemorrhoids that resulted in hemorrhoidectomy. Patient states that she is doing great. No problems with constipation or diarrhea, she is taking myrlax every day.  Patient feels the rest of the hemorrhoids have decreased in size, no further rectal bleeding or complaints of pain.    The following portions of the patient's history were reviewed and updated as appropriate: allergies, current medications, past family history, past medical history, past social history, past surgical history and problem list.    Review of Systems   Constitutional: Negative for chills, fever and unexpected weight change.   HENT: Negative for hearing loss, trouble swallowing and voice change.    Eyes: Negative for visual disturbance.   Respiratory: Negative for apnea, cough, chest tightness, shortness of breath and wheezing.    Cardiovascular: Negative for chest pain, palpitations and leg swelling.   Gastrointestinal: Negative for abdominal distention, abdominal pain, anal bleeding, blood in stool, constipation, diarrhea, nausea, rectal pain and vomiting.   Endocrine: Negative for cold intolerance and heat intolerance.   Genitourinary: Negative for difficulty urinating, dysuria and flank pain.   Musculoskeletal: Negative for back pain and gait problem.   Skin: Negative for color change, rash and wound.   Neurological: Negative for dizziness, syncope, speech difficulty, weakness, light-headedness, numbness and headaches.   Hematological: Negative for adenopathy. Does not bruise/bleed easily.   Psychiatric/Behavioral: Negative for confusion. The patient is not nervous/anxious.        Vital Signs  Vitals:    10/16/20 1550   BP: 110/68  "  Pulse: 99   Temp: 97 °F (36.1 °C)   SpO2: 99%   Weight: 77.1 kg (170 lb)   Height: 157.5 cm (62\")       Allergies:  No Known Allergies    Medications:    Current Outpatient Medications:   •  norgestimate-ethinyl estradiol (Ortho Tri-Cyclen Lo) 0.18/0.215/0.25 MG-25 MCG per tablet, Take 1 tablet by mouth Daily., Disp: 28 tablet, Rfl: 11    Physical Exam:   General Appearance:    Alert, cooperative, in no acute distress   Head:    Normocephalic, without obvious abnormality, atraumatic   Lungs:     Clear to auscultation,respirations regular, even and                  unlabored    Heart:    Regular rhythm and normal rate, normal S1 and S2, no            murmur, no gallop, no rub, no click   Abdomen:     Normal bowel sounds, no masses, no organomegaly, soft        non-tender, non-distended, no guarding, no rebound                Tenderness  Rectal-no visible hemorrhoids, minimal skin tag.   Extremities:   Moves all extremities well, no edema, no cyanosis, no             redness   Pulses:   Pulses palpable and equal bilaterally   Skin:   No bleeding, bruising or rash     Results Review:   I reviewed the patient's new clinical results.     Review of Systems was reviewed and confirmed as accurate as documented by the MA.    ASSESSMENT/PLAN:    1. Internal hemorrhoids       Patient reassured, no need to further excise hemorrhoid tissue.  Continue with MiraLAX, high-fiber diet and Citrucel daily.  Follow-up in 5 years for colonoscopy.    Electronically signed by Huong Esparza MD  10/16/20        "

## 2020-10-16 ENCOUNTER — OFFICE VISIT (OUTPATIENT)
Dept: SURGERY | Facility: CLINIC | Age: 40
End: 2020-10-16

## 2020-10-16 VITALS
OXYGEN SATURATION: 99 % | TEMPERATURE: 97 F | SYSTOLIC BLOOD PRESSURE: 110 MMHG | HEIGHT: 62 IN | BODY MASS INDEX: 31.28 KG/M2 | HEART RATE: 99 BPM | WEIGHT: 170 LBS | DIASTOLIC BLOOD PRESSURE: 68 MMHG

## 2020-10-16 DIAGNOSIS — K64.8 INTERNAL HEMORRHOIDS: Primary | ICD-10-CM

## 2020-10-16 PROCEDURE — 99212 OFFICE O/P EST SF 10 MIN: CPT | Performed by: SURGERY

## 2021-06-09 ENCOUNTER — OFFICE VISIT (OUTPATIENT)
Dept: FAMILY MEDICINE CLINIC | Facility: CLINIC | Age: 41
End: 2021-06-09

## 2021-06-09 ENCOUNTER — PATIENT MESSAGE (OUTPATIENT)
Dept: FAMILY MEDICINE CLINIC | Facility: CLINIC | Age: 41
End: 2021-06-09

## 2021-06-09 VITALS
RESPIRATION RATE: 16 BRPM | TEMPERATURE: 98 F | OXYGEN SATURATION: 99 % | HEART RATE: 70 BPM | BODY MASS INDEX: 32.19 KG/M2 | SYSTOLIC BLOOD PRESSURE: 124 MMHG | WEIGHT: 176 LBS | DIASTOLIC BLOOD PRESSURE: 82 MMHG

## 2021-06-09 DIAGNOSIS — Z12.4 SCREENING FOR CERVICAL CANCER: ICD-10-CM

## 2021-06-09 DIAGNOSIS — Z01.419 WELL WOMAN EXAM WITH ROUTINE GYNECOLOGICAL EXAM: Primary | ICD-10-CM

## 2021-06-09 DIAGNOSIS — Z13.220 SCREENING FOR LIPID DISORDERS: ICD-10-CM

## 2021-06-09 DIAGNOSIS — Z13.1 SCREENING FOR DIABETES MELLITUS: ICD-10-CM

## 2021-06-09 DIAGNOSIS — Z11.59 NEED FOR HEPATITIS C SCREENING TEST: ICD-10-CM

## 2021-06-09 PROCEDURE — 99396 PREV VISIT EST AGE 40-64: CPT | Performed by: FAMILY MEDICINE

## 2021-06-09 RX ORDER — NORGESTIMATE AND ETHINYL ESTRADIOL 7DAYSX3 28
1 KIT ORAL DAILY
COMMUNITY
End: 2021-06-09 | Stop reason: SDUPTHER

## 2021-06-09 RX ORDER — NORGESTIMATE AND ETHINYL ESTRADIOL 7DAYSX3 28
1 KIT ORAL DAILY
Qty: 28 TABLET | Refills: 11 | Status: SHIPPED | OUTPATIENT
Start: 2021-06-09 | End: 2022-03-08

## 2021-06-09 NOTE — PROGRESS NOTES
Roman Rosa is a 41 y.o. female.     History of Present Illness  Mrs. Rosa presents today for routine annual check up. Denies acute concerns today.    Reproductive History  A0  Pregnancy complications (HTN, DM): none  Sexual Activity: active in monogamous male relationship   Contraception: s/p tubal, on OCP for regulation of menses, mood  Menses: regular  STD hx: no  H/O abnormal pap: no  Cervical procedures: no    Social Hx  Household members: , 2 sons  Marital status:   Tobacco use: no  EtOH use: no  Illicit drug use: no    Lifestyle  Diet: varied/balanced but excess caloric intake  Exercise: none regularly  Guns in home: no  Using seatbelt: yes  Sunscreen: yes  Mental Health: no concerns today  Feels safe in home: yes    Screenings  Last pap: 2018  Last breast exam: 2018  Last mammogram: prior to age 39  Colonoscopy: n/a  DEXA: n/a  FLP: 2018  BG/A1c: 2018  Vitamin D: unsure  Last dental check up:   Last vision exam: regular eye exams due to poor vision  Immunizations UTD: yes    The following portions of the patient's history were reviewed and updated as appropriate: allergies, current medications, past family history, past medical history, past social history, past surgical history and problem list.    Review of Systems   Constitutional: Positive for fatigue. Negative for fever.   HENT: Positive for congestion (mild) and postnasal drip. Negative for sore throat and trouble swallowing.    Respiratory: Negative for cough, shortness of breath and wheezing.    Cardiovascular: Negative for chest pain, palpitations and leg swelling.   Gastrointestinal: Positive for abdominal pain (chronic, intermittent lower mid and LLQ), constipation (occasional) and diarrhea (occasional). Negative for blood in stool, nausea and vomiting.   Genitourinary: Positive for flank pain and frequency. Negative for dysuria, hematuria, menstrual problem, urgency and vaginal discharge.    Musculoskeletal: Positive for back pain.   Skin: Negative for rash.   Neurological: Negative for dizziness, syncope, weakness and headaches.   Hematological: Negative for adenopathy. Does not bruise/bleed easily.   Psychiatric/Behavioral: Positive for sleep disturbance. Negative for confusion and dysphoric mood.   Pt's previous ROS reviewed and updated as indicated.       Objective    Vitals:    06/09/21 1114   BP: 124/82   Pulse: 70   Resp: 16   Temp: 98 °F (36.7 °C)   SpO2: 99%     Body mass index is 32.19 kg/m².      06/09/21  1114   Weight: 79.8 kg (176 lb)       Physical Exam  Vitals and nursing note reviewed. Exam conducted with a chaperone present.   Constitutional:       General: She is not in acute distress.     Appearance: She is well-developed, well-groomed and overweight. She is not ill-appearing.   HENT:      Head: Normocephalic and atraumatic.   Eyes:      General: No scleral icterus.     Extraocular Movements: Extraocular movements intact.      Conjunctiva/sclera: Conjunctivae normal.   Neck:      Thyroid: No thyroid mass or thyromegaly.      Vascular: Normal carotid pulses.   Cardiovascular:      Rate and Rhythm: Normal rate and regular rhythm.      Pulses: Normal pulses.      Heart sounds: S1 normal and S2 normal. No murmur heard.   No gallop.    Pulmonary:      Effort: Pulmonary effort is normal.      Breath sounds: Normal breath sounds and air entry.   Chest:      Breasts:         Right: No swelling, bleeding, inverted nipple, mass, nipple discharge, skin change or tenderness.         Left: No swelling, bleeding, inverted nipple, mass, nipple discharge, skin change or tenderness.   Abdominal:      General: Bowel sounds are normal. There is no distension.      Palpations: Abdomen is soft. There is no hepatomegaly, splenomegaly or mass.      Tenderness: There is no abdominal tenderness.   Genitourinary:     Exam position: Supine.      Pubic Area: No rash.       Labia:         Right: No rash,  tenderness, lesion or injury.         Left: No rash, tenderness, lesion or injury.       Vagina: Normal. No vaginal discharge or bleeding.      Cervix: No cervical motion tenderness, discharge or friability.      Uterus: Not enlarged and not tender.       Adnexa:         Right: No mass or tenderness.          Left: No mass or tenderness.        Rectum: Normal. Guaiac result negative. No mass. Normal anal tone.      Comments: Thin prep pap obtained  Musculoskeletal:         General: No tenderness or deformity. Normal range of motion.      Cervical back: Neck supple.      Right lower leg: No edema.      Left lower leg: No edema.   Lymphadenopathy:      Cervical: No cervical adenopathy.      Upper Body:      Right upper body: No axillary or pectoral adenopathy.      Left upper body: No axillary or pectoral adenopathy.   Skin:     General: Skin is warm and dry.      Coloration: Skin is not jaundiced or pale.      Findings: No bruising or rash.   Neurological:      Mental Status: She is alert and oriented to person, place, and time.      Motor: No tremor.      Gait: Gait is intact.   Psychiatric:         Mood and Affect: Mood and affect normal.         Behavior: Behavior normal. Behavior is cooperative.         Cognition and Memory: Cognition and memory normal.         Assessment/Plan   Diagnoses and all orders for this visit:    1. Well woman exam with routine gynecological exam (Primary)  -     CBC (No Diff)  -     Comprehensive Metabolic Panel  -     Lipid Panel  -     Hemoglobin A1c  -     TSH Rfx On Abnormal To Free T4    2. Screening for diabetes mellitus  -     Hemoglobin A1c    3. Screening for lipid disorders  -     Lipid Panel    4. Screening for cervical cancer  -     Liquid-based Pap Smear, Screening; Future    5. Need for hepatitis C screening test  -     Hepatitis C Antibody       Age appropriate preventive care reviewed including cancer screenings, safety measures, mental health concerns, supplements,  prevention of CV disease and DM, etc. Handout provided.    Routine check up in 1 year, sooner as needed/instructed.  I will contact patient regarding test results and provide instructions regarding any necessary changes in plan of care.  Patient was encouraged to keep me informed of any acute changes, or any new concerning symptoms.  Pt is aware of reasons to seek emergent care.  Patient voiced understanding of all instructions and denied further questions.    Please note that portions of this note may have been completed with a voice recognition program. Efforts were made to edit the dictations, but occasionally words are mistranscribed.

## 2021-06-09 NOTE — TELEPHONE ENCOUNTER
From: Vivien Rosa  To: Katiana Sandoval MD  Sent: 6/9/2021 12:23 PM EDT  Subject: Prescription Question    I forgot to ask at the appointment if the birth control prescription is automatically renewed? I do feel like it has helped with my emotions, lol  Thanks

## 2021-06-10 PROBLEM — R73.03 PREDIABETES: Status: ACTIVE | Noted: 2021-06-10

## 2021-06-10 PROBLEM — K76.0 FATTY LIVER: Status: ACTIVE | Noted: 2021-06-10

## 2021-06-10 PROBLEM — R74.8 ABNORMAL LIVER ENZYMES: Status: ACTIVE | Noted: 2021-06-10

## 2021-06-10 LAB
ALBUMIN SERPL-MCNC: 4.5 G/DL (ref 3.5–5.2)
ALBUMIN/GLOB SERPL: 1.8 G/DL
ALP SERPL-CCNC: 110 U/L (ref 39–117)
ALT SERPL-CCNC: 74 U/L (ref 1–33)
AST SERPL-CCNC: 35 U/L (ref 1–32)
BILIRUB SERPL-MCNC: 0.2 MG/DL (ref 0–1.2)
BUN SERPL-MCNC: 9 MG/DL (ref 6–20)
BUN/CREAT SERPL: 16.4 (ref 7–25)
CALCIUM SERPL-MCNC: 9.7 MG/DL (ref 8.6–10.5)
CHLORIDE SERPL-SCNC: 105 MMOL/L (ref 98–107)
CHOLEST SERPL-MCNC: 177 MG/DL (ref 0–200)
CO2 SERPL-SCNC: 24.1 MMOL/L (ref 22–29)
CREAT SERPL-MCNC: 0.55 MG/DL (ref 0.57–1)
ERYTHROCYTE [DISTWIDTH] IN BLOOD BY AUTOMATED COUNT: 13.8 % (ref 12.3–15.4)
GLOBULIN SER CALC-MCNC: 2.5 GM/DL
GLUCOSE SERPL-MCNC: 64 MG/DL (ref 65–99)
HBA1C MFR BLD: 6.1 % (ref 4.8–5.6)
HCT VFR BLD AUTO: 40.5 % (ref 34–46.6)
HCV AB S/CO SERPL IA: <0.1 S/CO RATIO (ref 0–0.9)
HDLC SERPL-MCNC: 58 MG/DL (ref 40–60)
HGB BLD-MCNC: 12.8 G/DL (ref 12–15.9)
LDLC SERPL CALC-MCNC: 86 MG/DL (ref 0–100)
MCH RBC QN AUTO: 28.5 PG (ref 26.6–33)
MCHC RBC AUTO-ENTMCNC: 31.6 G/DL (ref 31.5–35.7)
MCV RBC AUTO: 90.2 FL (ref 79–97)
PLATELET # BLD AUTO: 413 10*3/MM3 (ref 140–450)
POTASSIUM SERPL-SCNC: 4.3 MMOL/L (ref 3.5–5.2)
PROT SERPL-MCNC: 7 G/DL (ref 6–8.5)
RBC # BLD AUTO: 4.49 10*6/MM3 (ref 3.77–5.28)
SODIUM SERPL-SCNC: 140 MMOL/L (ref 136–145)
TRIGL SERPL-MCNC: 195 MG/DL (ref 0–150)
TSH SERPL DL<=0.005 MIU/L-ACNC: 1.44 UIU/ML (ref 0.27–4.2)
VLDLC SERPL CALC-MCNC: 33 MG/DL (ref 5–40)
WBC # BLD AUTO: 8.57 10*3/MM3 (ref 3.4–10.8)

## 2021-10-28 ENCOUNTER — OFFICE VISIT (OUTPATIENT)
Dept: FAMILY MEDICINE CLINIC | Facility: CLINIC | Age: 41
End: 2021-10-28

## 2021-10-28 VITALS
TEMPERATURE: 97.4 F | DIASTOLIC BLOOD PRESSURE: 70 MMHG | OXYGEN SATURATION: 100 % | SYSTOLIC BLOOD PRESSURE: 120 MMHG | HEIGHT: 62 IN | WEIGHT: 176 LBS | HEART RATE: 77 BPM | BODY MASS INDEX: 32.39 KG/M2

## 2021-10-28 DIAGNOSIS — M79.601 RIGHT ARM PAIN: Primary | ICD-10-CM

## 2021-10-28 DIAGNOSIS — T88.1XXA LOCAL REACTION TO COVID-19 VACCINE: ICD-10-CM

## 2021-10-28 PROCEDURE — 99213 OFFICE O/P EST LOW 20 MIN: CPT | Performed by: FAMILY MEDICINE

## 2021-10-29 NOTE — PROGRESS NOTES
Roman Rosa is a 41 y.o. female.     History of Present Illness  She presents with c/o right arm pain and rash. Received Moderna COVID booster 10/26 (2 days). Began with soreness in upper right arm, now red, swollen, firm, radiating down into arm, mild soreness in axilla on right side. No systemic symptoms other than mild fatigue. Had school nurse check it, was told to go to PCP.    The following portions of the patient's history were reviewed and updated as appropriate: allergies, current medications, past family history, past medical history, past social history, past surgical history and problem list.    Review of Systems   Constitutional: Positive for fatigue. Negative for chills and fever.   HENT: Negative for mouth sores, sore throat and trouble swallowing.    Eyes: Negative for pain, discharge and redness.   Respiratory: Negative for cough, chest tightness, shortness of breath and wheezing.    Cardiovascular: Negative for chest pain and palpitations.   Gastrointestinal: Negative for abdominal pain, nausea and vomiting.   Musculoskeletal: Positive for myalgias.   Neurological: Negative for weakness and headaches.   Psychiatric/Behavioral: Negative for confusion.       Objective    Vitals:    10/28/21 1044   BP: 120/70   Pulse: 77   Temp: 97.4 °F (36.3 °C)   SpO2: 100%     Body mass index is 32.19 kg/m².      10/28/21  1044   Weight: 79.8 kg (176 lb)       Physical Exam  Vitals and nursing note reviewed.   Constitutional:       General: She is not in acute distress.     Appearance: She is well-developed and well-groomed. She is not ill-appearing.   HENT:      Head: Normocephalic and atraumatic.      Mouth/Throat:      Mouth: Mucous membranes are moist. No oral lesions.      Pharynx: Oropharynx is clear.   Eyes:      General: Lids are normal.      Conjunctiva/sclera: Conjunctivae normal.   Cardiovascular:      Rate and Rhythm: Normal rate and regular rhythm.      Pulses: Normal pulses.       Heart sounds: Normal heart sounds.   Pulmonary:      Effort: Pulmonary effort is normal.      Breath sounds: Normal breath sounds.   Musculoskeletal:      Right shoulder: Normal range of motion.      Right upper arm: Edema and tenderness (3 x 4 in upper right arm, deltoid area with induration, increased warmth ,erythema, moderate TTP) present.      Right elbow: Normal range of motion.        Arms:       Right lower leg: No edema.      Left lower leg: No edema.   Lymphadenopathy:      Cervical: No cervical adenopathy.   Skin:     General: Skin is warm and dry.   Neurological:      Mental Status: She is alert and oriented to person, place, and time.      Sensory: Sensation is intact.      Motor: Motor function is intact.   Psychiatric:         Mood and Affect: Mood and affect normal.         Behavior: Behavior normal. Behavior is cooperative.         Cognition and Memory: Cognition normal.         Assessment/Plan   Diagnoses and all orders for this visit:    1. Right arm pain (Primary)    2. Local reaction to COVID-19 vaccine       Localized reaction to COVID 19 booster. No systemic symptoms. Advise oral non-sedating antihistamine such as allegar or zyrtec x 1 week, topical benadryl, cool compress, gentle ROM exercise. Skin demarcated with ink pen to assist in monitoring for worsening erythema, swelling.    Encouraged to report to [facility]. Https://[facility]ers.hhs.gov/    Patient was encouraged to keep me informed of any acute changes, lack of improvement, or any new concerning symptoms.  Pt is aware of reasons to seek emergent care.  Patient voiced understanding of all instructions and denied further questions.

## 2022-01-04 ENCOUNTER — OFFICE VISIT (OUTPATIENT)
Dept: FAMILY MEDICINE CLINIC | Facility: CLINIC | Age: 42
End: 2022-01-04

## 2022-01-04 ENCOUNTER — TELEPHONE (OUTPATIENT)
Dept: FAMILY MEDICINE CLINIC | Facility: CLINIC | Age: 42
End: 2022-01-04

## 2022-01-04 VITALS
HEIGHT: 62 IN | DIASTOLIC BLOOD PRESSURE: 78 MMHG | SYSTOLIC BLOOD PRESSURE: 132 MMHG | TEMPERATURE: 97.5 F | HEART RATE: 75 BPM | RESPIRATION RATE: 16 BRPM | WEIGHT: 179.9 LBS | BODY MASS INDEX: 33.1 KG/M2 | OXYGEN SATURATION: 98 %

## 2022-01-04 DIAGNOSIS — K57.30 DIVERTICULOSIS OF COLON: ICD-10-CM

## 2022-01-04 DIAGNOSIS — R30.0 DYSURIA: Primary | ICD-10-CM

## 2022-01-04 DIAGNOSIS — R19.7 DIARRHEA, UNSPECIFIED TYPE: ICD-10-CM

## 2022-01-04 DIAGNOSIS — R10.9 ACUTE ABDOMINAL PAIN IN LEFT FLANK: ICD-10-CM

## 2022-01-04 LAB
BILIRUB BLD-MCNC: NEGATIVE MG/DL
CLARITY, POC: CLEAR
COLOR UR: YELLOW
EXPIRATION DATE: NORMAL
GLUCOSE UR STRIP-MCNC: NEGATIVE MG/DL
KETONES UR QL: NEGATIVE
LEUKOCYTE EST, POC: NEGATIVE
Lab: NORMAL
NITRITE UR-MCNC: NEGATIVE MG/ML
PH UR: 6 [PH] (ref 5–8)
PROT UR STRIP-MCNC: NEGATIVE MG/DL
RBC # UR STRIP: NEGATIVE /UL
SP GR UR: 1.01 (ref 1–1.03)
UROBILINOGEN UR QL: NORMAL

## 2022-01-04 PROCEDURE — 81003 URINALYSIS AUTO W/O SCOPE: CPT | Performed by: FAMILY MEDICINE

## 2022-01-04 PROCEDURE — 99214 OFFICE O/P EST MOD 30 MIN: CPT | Performed by: FAMILY MEDICINE

## 2022-01-04 NOTE — TELEPHONE ENCOUNTER
Caller: Vivien Rosa    Relationship: Self    Best call back number: [phone]     Caller requesting test results: THE PATIENT     What test was performed: CT SCAN OF ABDOMEN   When was the test performed: 01/04/2022    Where was the test performed: [facility]    Additional notes:THE PATIENT REPORTS THAT SHE WAS TOLD SOMEONE WOULD REACH OUT WITH RESULTS TODAY, 01/04/2022. PLEASE CALL AND ADVISE [phone]  BECAUSE YOU ARE STILL IN BAD PAIN.

## 2022-01-04 NOTE — PROGRESS NOTES
"                      Established Patient        Chief Complaint:   Chief Complaint   Patient presents with   • Diarrhea   • Back Pain        Vivien Rosa is a 41 y.o. female who presents today with complaints of diarrhea and back pain.    History of Present Illness:   She reports having intermittent pain before 12/25/2021in her lower back more so on left side and that certain food would trigger that pain. She reports that the pain can radiate to her abdomen. The pain has been consistently present since before Christmas with varying degrees of pain which intensified to severe pain last night accompanied by chronic diarrhea and nausea. She took Pepto-Bismol which was not effective. She reports having similar symptoms 2 years ago and that she saw Katiana Sandoval MD, who suspected diverticulitis. She confirms having had a colonoscopy and sigmoidoscopy for hemorrhoid banding prior to seeing Katiana in 2015 when she was seeing Dr. Esparza. She reports having had a hemorrhoidectomy. She states that she is currently experiencing \"crampy colicky pain that comes in waves.\" She confirms having had kidney stones in the past and she states that she assumed that the pain before Christmas was that and she was hoping she would \"pass it.\"    She denies any blood in stools and she is unsure if she had any fever but reports feeling a bit warm yesterday but she is uncertain because \"with the changes she goes back and forth.\" She reports urinating a lot and denies having had an urinalysis done today.    Subjective     The following portions of the patient's history were reviewed and updated as appropriate: allergies, current medications, past family history, past medical history, past social history, past surgical history and problem list.    No Known Allergies    Review of Systems  1. Constitutional: Subjective fever. Negative for chills, diaphoresis, fatigue and unexpected weight change.   2. HENT: No dysphagia; no changes to " "vision/hearing/smell/taste; no epistaxis  3. Eyes: Negative for redness and visual disturbance.   4. Respiratory: negative for shortness of breath. Negative for chest pain . Negative for cough and chest tightness.   5. Cardiovascular: Negative for chest pain and palpitations.   6. Gastrointestinal: as per above.  7. Endocrine: Negative for cold intolerance and heat intolerance.   8. Genitourinary: Negative for difficulty urinating, dysuria and frequency.   9. Musculoskeletal: Negative for arthralgias, back pain and myalgias.   10. Skin: Negative for color change, rash and wound.   11. Neurological: Negative for syncope, weakness and headaches.   12. Hematological: Negative for adenopathy. Does not bruise/bleed easily.   13. Psychiatric/Behavioral: Negative for confusion. The patient is not nervous/anxious.    Objective     Physical Exam   Vital Signs: /78   Pulse 75   Temp 97.5 °F (36.4 °C)   Resp 16   Ht 157.5 cm (62\")   Wt 81.6 kg (179 lb 14.4 oz)   SpO2 98%   BMI 32.90 kg/m²     General Appearance: alert, oriented x 3, no acute distress.  Skin: warm and dry.   HEENT: Atraumatic.  pupils round and reactive to light and accommodation, oral mucosa pink and moist.  Nares patent without epistaxis.  External auditory canals are patent tympanic membranes intact.  Neck: supple, no JVD, trachea midline.  No thyromegaly  Lungs: CTA, unlabored breathing effort.  Heart: RRR, normal S1 and S2, no S3, no rub.  Abdomen:  There is left flank pain, focally tender in two different areas, to a lesser degree to the left lower quadrant to the abdomen. No defined CVA tenderness. There are positive bowel sounds x4, hyperactive in the left lower quadrant. Soft and no rigidity.  Extremities: no clubbing, cyanosis or edema.  Good range of motion actively and passively.  Symmetric muscle strength and development  Neuro: normal speech and mental status.  Cranial nerves II through XII intact.  No anosmia. DTR 2+; proprioception " intact.  No focal motor/sensory deficits.    Assessment and Plan      Assessment/Plan:   Diagnoses and all orders for this visit:    1. Dysuria (Primary)  -     CT Abdomen Pelvis Stone Protocol  -     POCT urinalysis dipstick, automated  - Urinalysis demonstrates no substantial abnormalities, no obvious blood.   - Given her past history of CT scan of the abdomen/pelvis demonstrating left nephrolithiasis, ureterolithiasis certainly remains  a potential etiology of her symptoms. I have recommended a CT scan of the abdomen/pelvis to rule out stone.    2. Acute abdominal pain in left flank  -     CT Abdomen Pelvis Stone Protocol  -     POCT urinalysis dipstick, automated  - CT scan of the abdomen/pelvis as per above.     3. Diverticulosis of colon  -     CT Abdomen Pelvis Stone Protocol  - I am unable to locate her past colonoscopy, as this did occur approximately 6 to 7 years ago.   - She is focally tender in an area consistent with diverticulitis, and given her diarrhea, it certainly remains a higher likelihood of  cause of her symptoms.  - Plan to follow results of CT scan of the abdomen/pelvis, vital signs demonstrate hemodynamic stability.   - Further changes to her treatment range will be made after results of CT scan are known.     4. Diarrhea, unspecified type  I have added a 10-day course of twice daily dosing metronidazole and ciprofloxacin.  Plan to follow clinically.  I have notified patient of the results of her CT scan.  Short course analgesic sent to her pharmacy additionally.  I have utilized oxycodone without Tylenol given her known liver issues.    Discussion Summary:    I spent 30 minutes caring for Vivien on this date of service. This time includes time spent by me in the following activities:preparing for the visit, reviewing tests, performing a medically appropriate examination and/or evaluation , counseling and educating the patient/family/caregiver, ordering medications, tests, or procedures,  documenting information in the medical record and care coordination    Discussed plan of care in detail with pt today; pt verb understanding and agrees.  Follow up:  Return in about 10 days (around 1/14/2022) for Recheck.     There are no Patient Instructions on file for this visit.    Aldo Cabrales DO  01/05/22  08:51 EST      Transcribed from ambient dictation for Aldo Cabrales DO by Bri Layne.  01/04/22   16:06 EST    Patient verbalized consent to the visit recording.  I have personally performed the services described in this document as transcribed by the above individual, and it is both accurate and complete.  Aldo Cabrales DO  1/5/2022  08:50 EST        Please note that portions of this note may have been completed with a voice recognition program. Efforts were made to edit the dictations, but occasionally words are mistranscribed.

## 2022-01-05 RX ORDER — METRONIDAZOLE 500 MG/1
500 TABLET ORAL 3 TIMES DAILY
Qty: 30 TABLET | Refills: 0 | Status: SHIPPED | OUTPATIENT
Start: 2022-01-05 | End: 2022-01-15

## 2022-01-05 RX ORDER — OXYCODONE HYDROCHLORIDE 5 MG/1
5 CAPSULE ORAL EVERY 6 HOURS PRN
Qty: 20 CAPSULE | Refills: 0 | Status: SHIPPED | OUTPATIENT
Start: 2022-01-05 | End: 2022-07-18

## 2022-01-05 RX ORDER — CIPROFLOXACIN 250 MG/1
250 TABLET, FILM COATED ORAL 2 TIMES DAILY
Qty: 20 TABLET | Refills: 0 | Status: SHIPPED | OUTPATIENT
Start: 2022-01-05 | End: 2022-01-15

## 2022-03-08 RX ORDER — NORGESTIMATE AND ETHINYL ESTRADIOL 7DAYSX3 28
KIT ORAL
Qty: 28 TABLET | Refills: 11 | Status: SHIPPED | OUTPATIENT
Start: 2022-03-08 | End: 2023-03-20

## 2022-07-18 ENCOUNTER — OFFICE VISIT (OUTPATIENT)
Dept: FAMILY MEDICINE CLINIC | Facility: CLINIC | Age: 42
End: 2022-07-18

## 2022-07-18 VITALS
TEMPERATURE: 98.3 F | HEIGHT: 62 IN | WEIGHT: 179 LBS | BODY MASS INDEX: 32.94 KG/M2 | OXYGEN SATURATION: 98 % | SYSTOLIC BLOOD PRESSURE: 130 MMHG | DIASTOLIC BLOOD PRESSURE: 80 MMHG | HEART RATE: 100 BPM

## 2022-07-18 DIAGNOSIS — J02.9 SORE THROAT: ICD-10-CM

## 2022-07-18 DIAGNOSIS — J06.9 ACUTE URI: Primary | ICD-10-CM

## 2022-07-18 DIAGNOSIS — J02.9 ACUTE PHARYNGITIS, UNSPECIFIED ETIOLOGY: ICD-10-CM

## 2022-07-18 LAB
EXPIRATION DATE: NORMAL
EXPIRATION DATE: NORMAL
FLUAV AG UPPER RESP QL IA.RAPID: NOT DETECTED
FLUBV AG UPPER RESP QL IA.RAPID: NOT DETECTED
INTERNAL CONTROL: NORMAL
INTERNAL CONTROL: NORMAL
Lab: NORMAL
Lab: NORMAL
S PYO AG THROAT QL: NEGATIVE
SARS-COV-2 AG UPPER RESP QL IA.RAPID: NOT DETECTED

## 2022-07-18 PROCEDURE — 99213 OFFICE O/P EST LOW 20 MIN: CPT | Performed by: FAMILY MEDICINE

## 2022-07-18 PROCEDURE — 87880 STREP A ASSAY W/OPTIC: CPT | Performed by: FAMILY MEDICINE

## 2022-07-18 PROCEDURE — 87428 SARSCOV & INF VIR A&B AG IA: CPT | Performed by: FAMILY MEDICINE

## 2022-07-18 RX ORDER — AMOXICILLIN AND CLAVULANATE POTASSIUM 875; 125 MG/1; MG/1
1 TABLET, FILM COATED ORAL 2 TIMES DAILY
Qty: 20 TABLET | Refills: 0 | Status: SHIPPED | OUTPATIENT
Start: 2022-07-18 | End: 2022-07-28

## 2022-07-18 RX ORDER — ALBUTEROL SULFATE 90 UG/1
2 AEROSOL, METERED RESPIRATORY (INHALATION) EVERY 4 HOURS PRN
Qty: 18 G | Refills: 1 | Status: SHIPPED | OUTPATIENT
Start: 2022-07-18 | End: 2022-08-23 | Stop reason: SDUPTHER

## 2022-07-18 RX ORDER — DEXAMETHASONE 4 MG/1
4 TABLET ORAL
Qty: 5 TABLET | Refills: 0 | Status: SHIPPED | OUTPATIENT
Start: 2022-07-18 | End: 2022-07-23

## 2022-07-18 RX ORDER — LIDOCAINE HYDROCHLORIDE 20 MG/ML
SOLUTION OROPHARYNGEAL
Qty: 100 ML | Refills: 0 | Status: SHIPPED | OUTPATIENT
Start: 2022-07-18 | End: 2022-08-23

## 2022-07-18 NOTE — PROGRESS NOTES
Subjective   Vivien Rosa is a 42 y.o. female.     She c/o sore throat, congestion    Sore Throat   This is a new problem. The current episode started in the past 7 days. The problem has been rapidly worsening. Neither side of throat is experiencing more pain than the other. The maximum temperature recorded prior to her arrival was 100.4 - 100.9 F. The fever has been present for 1 to 2 days. The pain is moderate. Associated symptoms include congestion, coughing, headaches, a hoarse voice, shortness of breath (mild) and trouble swallowing (mild). Pertinent negatives include no diarrhea, ear pain, neck pain, swollen glands or vomiting. Exposure to: no known. Treatments tried: otc cough/cold medicines. The treatment provided no relief.       The following portions of the patient's history were reviewed and updated as appropriate: allergies, current medications, past family history, past medical history, past social history, past surgical history and problem list.    Review of Systems   Constitutional: Positive for fatigue and fever.   HENT: Positive for congestion, hoarse voice, postnasal drip, rhinorrhea, sore throat and trouble swallowing (mild). Negative for ear pain, mouth sores, nosebleeds and sinus pain.    Eyes: Negative for pain, discharge and redness.   Respiratory: Positive for cough, shortness of breath (mild) and wheezing.    Cardiovascular: Negative for chest pain.   Gastrointestinal: Negative for diarrhea and vomiting.   Musculoskeletal: Negative for neck pain.   Skin: Negative for rash.   Neurological: Positive for headaches.   Hematological: Negative for adenopathy.   Psychiatric/Behavioral: Positive for sleep disturbance (due to cough). Negative for confusion.       Objective    Vitals:    07/18/22 1004   BP: 130/80   Pulse: 100   Temp: 98.3 °F (36.8 °C)   SpO2: 98%     Body mass index is 32.74 kg/m².      07/18/22  1004   Weight: 81.2 kg (179 lb)       Physical Exam  Vitals and nursing note  reviewed.   Constitutional:       Appearance: She is well-developed and well-groomed. She is ill-appearing (mildly).   HENT:      Right Ear: Tympanic membrane, ear canal and external ear normal.      Left Ear: Tympanic membrane, ear canal and external ear normal.      Nose: Mucosal edema and congestion present.      Mouth/Throat:      Mouth: Mucous membranes are moist. No oral lesions.      Pharynx: Posterior oropharyngeal erythema and uvula swelling (mild) present. No oropharyngeal exudate.   Eyes:      Conjunctiva/sclera: Conjunctivae normal.   Cardiovascular:      Rate and Rhythm: Normal rate and regular rhythm.      Pulses: Normal pulses.      Heart sounds: Normal heart sounds.   Pulmonary:      Effort: Pulmonary effort is normal. No respiratory distress (dry cough upon expiration).      Breath sounds: Decreased breath sounds present. No wheezing or rhonchi.   Lymphadenopathy:      Cervical: No cervical adenopathy.   Skin:     General: Skin is warm and dry.      Findings: No rash.   Neurological:      Mental Status: She is alert and oriented to person, place, and time.   Psychiatric:         Behavior: Behavior normal. Behavior is cooperative.         Cognition and Memory: Cognition normal.       Recent Results (from the past 24 hour(s))   POCT rapid strep A    Collection Time: 07/18/22 10:16 AM    Specimen: Swab   Result Value Ref Range    Rapid Strep A Screen Negative Negative, VALID, INVALID, Not Performed    Internal Control Passed Passed    Lot Number [id number]     Expiration Date 09/30/2023    POCT SARS-CoV-2 Antigen ANNA + Flu    Collection Time: 07/18/22 10:22 AM    Specimen: Swab   Result Value Ref Range    SARS Antigen Not Detected Not Detected, Presumptive Negative    Influenza A Antigen ANNA Not Detected Not Detected    Influenza B Antigen ANNA Not Detected Not Detected    Internal Control Passed Passed    Lot Number [id number]     Expiration Date 02/04/2023          Assessment & Plan   Diagnoses and all  orders for this visit:    1. Acute URI (Primary)  -     HYDROcod Polst-CPM Polst ER (Tussionex Pennkinetic ER) 10-8 MG/5ML ER suspension; Take 5 mL by mouth Every 12 (Twelve) Hours As Needed for Cough or Rhinitis.  Dispense: 100 mL; Refill: 0    2. Sore throat  -     POCT SARS-CoV-2 Antigen ANNA + Flu  -     POCT rapid strep A    3. Acute pharyngitis, unspecified etiology    Other orders  -     amoxicillin-clavulanate (Augmentin) 875-125 MG per tablet; Take 1 tablet by mouth 2 (Two) Times a Day for 10 days.  Dispense: 20 tablet; Refill: 0  -     Lidocaine Viscous HCl (XYLOCAINE) 2 % solution; Gargle and swallow 10 ml every 4 hours as needed for severe throat pain  Dispense: 100 mL; Refill: 0  -     albuterol sulfate  (90 Base) MCG/ACT inhaler; Inhale 2 puffs Every 4 (Four) Hours As Needed for Wheezing or Shortness of Air.  Dispense: 18 g; Refill: 1  -     dexamethasone (DECADRON) 4 MG tablet; Take 1 tablet by mouth Daily With Breakfast for 5 days.  Dispense: 5 tablet; Refill: 0       Symptom mgnt reviewed.  Suspect viral illness.  Delayed rx for augmentin to be taken if symptoms acutely worsen or are not improved within next 48 hours.  Patient was encouraged to keep me informed of any acute changes, lack of improvement, or any new concerning symptoms.  Pt is aware of reasons to seek emergent care.  Patient voiced understanding of all instructions and denied further questions.  As part of patient's treatment plan I am prescribing a controlled substance.  The patient has been made aware of the appropriate use of such medications, including potential risk of somnolence, limited ability to drive and/or work safely, and potential for dependence and/or overdose.  It has also been made clear that these medications are for use by this patient only, without concomitant use of alcohol or other substances, unless prescribed.  Faith reviewed.    Please note that portions of this note may have been completed with a voice  recognition program. Efforts were made to edit the dictations, but occasionally words are mistranscribed.

## 2022-08-23 ENCOUNTER — TELEMEDICINE (OUTPATIENT)
Dept: FAMILY MEDICINE CLINIC | Facility: CLINIC | Age: 42
End: 2022-08-23

## 2022-08-23 DIAGNOSIS — U07.1 COVID-19 VIRUS INFECTION: Primary | ICD-10-CM

## 2022-08-23 PROCEDURE — 99213 OFFICE O/P EST LOW 20 MIN: CPT | Performed by: FAMILY MEDICINE

## 2022-08-23 RX ORDER — DEXAMETHASONE 4 MG/1
4 TABLET ORAL
Qty: 5 TABLET | Refills: 0 | Status: SHIPPED | OUTPATIENT
Start: 2022-08-23 | End: 2022-08-28

## 2022-08-23 RX ORDER — ALBUTEROL SULFATE 90 UG/1
2 AEROSOL, METERED RESPIRATORY (INHALATION) EVERY 4 HOURS PRN
Qty: 18 G | Refills: 1 | Status: SHIPPED | OUTPATIENT
Start: 2022-08-23 | End: 2022-09-26

## 2022-08-23 RX ORDER — AZITHROMYCIN 250 MG/1
TABLET, FILM COATED ORAL
Qty: 6 TABLET | Refills: 0 | Status: SHIPPED | OUTPATIENT
Start: 2022-08-23 | End: 2022-10-26

## 2022-08-23 NOTE — PROGRESS NOTES
"TELEHEALTH/VIDEO VISIT  [facility]    JULIANNA BURDEN   1980    You have chosen to receive care through a telehealth visit.  Do you consent to use a video/audio connection for your medical care today? Yes    In order to limit face-to-face contact and enact \"social distancing\" in light of the COVID-19 outbreaks and in accordance to the recommendations per the CDC, WHO, and our individual department, she was contacted by telephone.  Telephone visit was used to screen the patient for needs and conduct her apt.     History of Present Illness   She reports testing positive via home test yesterday prior to school. Has had increasing cough for few weeks but worsened acutely past few days. assoc'd with wheezing, thick postnasal drip, choking at night.  Some subjective fever. Recently on prednisone for wheeze. Also just started mucinex D.      Patient's past medical hx, surgical hx, family hx, social hx reviewed on chart. Medication and allergy lists reviewed on chart. Information updated as indicated.      Review of Systems   Constitutional: Positive for fatigue and fever.   HENT: Positive for congestion, postnasal drip, rhinorrhea and sore throat (mild). Negative for ear pain, mouth sores, nosebleeds and sinus pain.    Eyes: Negative for pain, discharge and redness.   Respiratory: Positive for cough, chest tightness, shortness of breath (mild) and wheezing.    Cardiovascular: Negative for chest pain.   Gastrointestinal: Negative for diarrhea, nausea and vomiting.   Musculoskeletal: Negative for neck pain.   Skin: Negative for rash.   Neurological: Positive for headaches.   Hematological: Negative for adenopathy.   Psychiatric/Behavioral: Positive for sleep disturbance (due to cough). Negative for confusion.   Pt's previous ROS reviewed and updated as indicated.         Physical Exam  Constitutional:       General: She is not in acute distress.     Appearance: She is ill-appearing.   Eyes:      " Conjunctiva/sclera: Conjunctivae normal.   Pulmonary:      Effort: Pulmonary effort is normal.      Comments: Active cough  Skin:     Coloration: Skin is not jaundiced or pale.   Neurological:      Mental Status: She is alert and oriented to person, place, and time.   Psychiatric:         Behavior: Behavior normal. Behavior is cooperative.         Cognition and Memory: Cognition normal.           Diagnoses and all orders for this visit:    COVID-19 virus infection  -     HYDROcod Polst-CPM Polst ER (Tussionex Pennkinetic ER) 10-8 MG/5ML ER suspension; Take 5 mL by mouth Every 12 (Twelve) Hours As Needed for Cough or Rhinitis.    Other orders  -     azithromycin (Zithromax Z-Rich) 250 MG tablet; Take 2 tablets the first day, then 1 tablet daily for 4 days.  -     dexamethasone (DECADRON) 4 MG tablet; Take 1 tablet by mouth Daily With Breakfast for 5 days.  -     albuterol sulfate  (90 Base) MCG/ACT inhaler; Inhale 2 puffs Every 4 (Four) Hours As Needed for Wheezing or Shortness of Air.      Symptom mgnt reviewed.  5 day quarantine then return to work with mask x 1 week.  Patient will f/u as needed.  Patient was encouraged to keep me informed of any acute changes, lack of improvement, or any new concerning symptoms.  Pt is aware of reasons to seek emergent care.  Patient voiced understanding of all instructions and denied further questions.  As part of patient's treatment plan I am prescribing a controlled substance.  The patient has been made aware of the appropriate use of such medications, including potential risk of somnolence, limited ability to drive and/or work safely, and potential for dependence and/or overdose.  It has also been made clear that these medications are for use by this patient only, without concomitant use of alcohol or other substances, unless prescribed.  ALISSA reviewed.      Total face to face time with patient was 8 minutes.    Please note that portions of this note may have been  completed with a voice recognition program. Efforts were made to edit the dictations, but occasionally words are mistranscribed.

## 2022-09-26 RX ORDER — ALBUTEROL SULFATE 90 UG/1
AEROSOL, METERED RESPIRATORY (INHALATION)
Qty: 8.5 G | Refills: 1 | Status: SHIPPED | OUTPATIENT
Start: 2022-09-26

## 2022-10-26 ENCOUNTER — OFFICE VISIT (OUTPATIENT)
Dept: FAMILY MEDICINE CLINIC | Facility: CLINIC | Age: 42
End: 2022-10-26

## 2022-10-26 VITALS
TEMPERATURE: 97.2 F | OXYGEN SATURATION: 98 % | DIASTOLIC BLOOD PRESSURE: 88 MMHG | HEART RATE: 98 BPM | RESPIRATION RATE: 16 BRPM | WEIGHT: 180.8 LBS | HEIGHT: 62 IN | BODY MASS INDEX: 33.27 KG/M2 | SYSTOLIC BLOOD PRESSURE: 130 MMHG

## 2022-10-26 DIAGNOSIS — E66.09 CLASS 1 OBESITY DUE TO EXCESS CALORIES WITH SERIOUS COMORBIDITY AND BODY MASS INDEX (BMI) OF 33.0 TO 33.9 IN ADULT: ICD-10-CM

## 2022-10-26 DIAGNOSIS — H69.83 DYSFUNCTION OF BOTH EUSTACHIAN TUBES: Primary | ICD-10-CM

## 2022-10-26 DIAGNOSIS — R42 VERTIGO: ICD-10-CM

## 2022-10-26 DIAGNOSIS — J01.10 ACUTE NON-RECURRENT FRONTAL SINUSITIS: ICD-10-CM

## 2022-10-26 DIAGNOSIS — R11.0 NAUSEA: ICD-10-CM

## 2022-10-26 PROCEDURE — 99213 OFFICE O/P EST LOW 20 MIN: CPT | Performed by: NURSE PRACTITIONER

## 2022-10-26 RX ORDER — PREDNISONE 10 MG/1
TABLET ORAL
Qty: 15 TABLET | Refills: 0 | Status: SHIPPED | OUTPATIENT
Start: 2022-10-26 | End: 2022-11-28

## 2022-10-26 RX ORDER — CHLORCYCLIZINE HYDROCHLORIDE AND PSEUDOEPHEDRINE HYDROCHLORIDE 25; 60 MG/1; MG/1
1 TABLET ORAL 3 TIMES DAILY PRN
Qty: 42 TABLET | Refills: 1 | Status: SHIPPED | OUTPATIENT
Start: 2022-10-26

## 2022-10-26 RX ORDER — ONDANSETRON 4 MG/1
4 TABLET, ORALLY DISINTEGRATING ORAL EVERY 8 HOURS PRN
Qty: 20 TABLET | Refills: 1 | Status: SHIPPED | OUTPATIENT
Start: 2022-10-26

## 2022-11-28 DIAGNOSIS — K57.92 DIVERTICULITIS: Primary | ICD-10-CM

## 2022-11-28 RX ORDER — METRONIDAZOLE 500 MG/1
500 TABLET ORAL 3 TIMES DAILY
Qty: 21 TABLET | Refills: 0 | Status: SHIPPED | OUTPATIENT
Start: 2022-11-28 | End: 2022-12-05

## 2022-11-28 RX ORDER — CIPROFLOXACIN 500 MG/1
500 TABLET, FILM COATED ORAL 2 TIMES DAILY
Qty: 14 TABLET | Refills: 0 | Status: SHIPPED | OUTPATIENT
Start: 2022-11-28 | End: 2022-12-05

## 2023-03-20 RX ORDER — NORGESTIMATE AND ETHINYL ESTRADIOL 7DAYSX3 28
KIT ORAL
Qty: 28 TABLET | Refills: 11 | Status: SHIPPED | OUTPATIENT
Start: 2023-03-20

## 2023-06-27 PROBLEM — E66.09 CLASS 1 OBESITY DUE TO EXCESS CALORIES WITHOUT SERIOUS COMORBIDITY WITH BODY MASS INDEX (BMI) OF 33.0 TO 33.9 IN ADULT: Status: ACTIVE | Noted: 2023-06-27

## 2023-06-27 PROBLEM — R03.0 PREHYPERTENSION: Status: ACTIVE | Noted: 2023-06-27

## 2023-06-27 PROBLEM — N20.0 NEPHROLITHIASIS: Status: ACTIVE | Noted: 2023-06-27

## 2023-06-27 PROBLEM — E66.811 CLASS 1 OBESITY DUE TO EXCESS CALORIES WITHOUT SERIOUS COMORBIDITY WITH BODY MASS INDEX (BMI) OF 33.0 TO 33.9 IN ADULT: Status: ACTIVE | Noted: 2023-06-27

## 2023-08-23 ENCOUNTER — OFFICE VISIT (OUTPATIENT)
Dept: FAMILY MEDICINE CLINIC | Facility: CLINIC | Age: 43
End: 2023-08-23
Payer: COMMERCIAL

## 2023-08-23 VITALS
WEIGHT: 184.2 LBS | TEMPERATURE: 98.2 F | HEART RATE: 83 BPM | DIASTOLIC BLOOD PRESSURE: 96 MMHG | HEIGHT: 62 IN | BODY MASS INDEX: 33.9 KG/M2 | OXYGEN SATURATION: 99 % | SYSTOLIC BLOOD PRESSURE: 132 MMHG

## 2023-08-23 DIAGNOSIS — I10 PRIMARY HYPERTENSION: Primary | ICD-10-CM

## 2023-08-23 PROCEDURE — 99213 OFFICE O/P EST LOW 20 MIN: CPT | Performed by: NURSE PRACTITIONER

## 2023-08-23 RX ORDER — HYDROCHLOROTHIAZIDE 25 MG/1
25 TABLET ORAL DAILY
Qty: 30 TABLET | Refills: 1 | Status: SHIPPED | OUTPATIENT
Start: 2023-08-23

## 2023-08-23 NOTE — PROGRESS NOTES
Subjective     Chief Complaint:    Chief Complaint   Patient presents with    Blood pressure recheck     Patient has been keeping track of BP at home. Has been elevated recently.        History of Present Illness:   Vivien Rosa is a 43 year old female patient here for a BP check. She does not monitor BP at home, but she did have the school nurse check her BP 8/18 142/96 and 8/22 152/102, 8/23 143/106. She states she her mother, brother, sister, and uncle are all on BP medication. Mother has Sjogren's disease and recently had a stent in her heart.     She denies organized activity but reports an active lifestyle. Prepares most meals at home and monitors her sodium but does eat fast food occasionally.       Review of Systems   Cardiovascular:  Positive for palpitations (r/t stress).   Neurological:  Positive for dizziness, light-headedness (vertigo) and headache.   Psychiatric/Behavioral:  Positive for sleep disturbance.    Gen- No fevers, chills  CV- No chest pain, palpitations  Resp- No cough, dyspnea  GI- No N/V/D, abd pain  Neuro-No dizziness, headaches      I have reviewed and/or updated the patient's past medical, surgical, family, social history and problem list as appropriate.     Medications:    Current Outpatient Medications:     albuterol sulfate  (90 Base) MCG/ACT inhaler, INHALE 2 PUFFS BY MOUTH EVERY 4 HOURS AS NEEDED FOR WHEEZING OR SHORTNESS OF BREATH, Disp: 8.5 g, Rfl: 1    Tri-Sprintec 0.18/0.215/0.25 MG-35 MCG per tablet, TAKE 1 TABLET BY MOUTH DAILY, Disp: 28 tablet, Rfl: 11    hydroCHLOROthiazide (HYDRODIURIL) 25 MG tablet, Take 1 tablet by mouth Daily., Disp: 30 tablet, Rfl: 1    Allergies:  No Known Allergies    Objective     Vital Signs:   Vitals:    08/23/23 1642 08/23/23 1646   BP: (!) 132/112 132/96   BP Location: Right arm Left arm   Patient Position: Sitting Sitting   Cuff Size: Adult Adult   Pulse: 83    Temp: 98.2 °F (36.8 °C)    TempSrc: Oral    SpO2: 99%    Weight:  "83.6 kg (184 lb 3.2 oz)    Height: 157.5 cm (62\")  Comment: patient stated    PainSc: 0-No pain      Body mass index is 33.69 kg/m².    Physical Exam:    Physical Exam  Vitals and nursing note reviewed.   Constitutional:       Appearance: She is well-developed.   HENT:      Head: Normocephalic and atraumatic.   Eyes:      Pupils: Pupils are equal, round, and reactive to light.   Cardiovascular:      Rate and Rhythm: Normal rate and regular rhythm.      Heart sounds: Normal heart sounds.   Pulmonary:      Effort: Pulmonary effort is normal.      Breath sounds: Normal breath sounds.   Abdominal:      General: Bowel sounds are normal. There is no distension.      Palpations: Abdomen is soft.      Tenderness: There is no abdominal tenderness.   Musculoskeletal:      Cervical back: Neck supple.   Skin:     General: Skin is warm and dry.   Neurological:      General: No focal deficit present.      Mental Status: She is alert and oriented to person, place, and time.   Psychiatric:         Mood and Affect: Mood normal.         Behavior: Behavior normal.       Assessment / Plan     Assessment/Plan:   Problem List Items Addressed This Visit    None  Visit Diagnoses       Primary hypertension    -  Primary    Relevant Medications    hydroCHLOROthiazide (HYDRODIURIL) 25 MG tablet          --will start HCTZ  --discussed dc'ing hormone replacement due to HTN, possible contributing factor  --Patient instructed to check blood pressure daily, record, and bring to next visit. If the readings run 140/90 or greater, the patient is to call my office or return sooner for evaluation. Pt advised to eat a heart healthy diet and get regular aerobic exercise.    Discussed plan of care in detail with pt today; pt verb understanding and agrees.    Follow up:  Return 3-4 weeks, for F/U Dr. Sandoval.    ITheresa, personally performed the services described in this documentation, as scribed by NELSON Steele student in my presence, " and is both accurate and complete      Electronically signed by NELSON Padron   08/23/2023 16:54 EDT      Please note that portions of this note were completed with a voice recognition program.

## 2023-09-05 ENCOUNTER — OFFICE VISIT (OUTPATIENT)
Dept: PULMONOLOGY | Facility: CLINIC | Age: 43
End: 2023-09-05
Payer: COMMERCIAL

## 2023-09-05 ENCOUNTER — PATIENT ROUNDING (BHMG ONLY) (OUTPATIENT)
Dept: PULMONOLOGY | Facility: CLINIC | Age: 43
End: 2023-09-05
Payer: COMMERCIAL

## 2023-09-05 VITALS
RESPIRATION RATE: 18 BRPM | OXYGEN SATURATION: 99 % | DIASTOLIC BLOOD PRESSURE: 83 MMHG | WEIGHT: 182.2 LBS | HEIGHT: 62 IN | SYSTOLIC BLOOD PRESSURE: 136 MMHG | BODY MASS INDEX: 33.53 KG/M2 | HEART RATE: 87 BPM

## 2023-09-05 DIAGNOSIS — G47.19 EXCESSIVE DAYTIME SLEEPINESS: ICD-10-CM

## 2023-09-05 DIAGNOSIS — G47.33 OBSTRUCTIVE SLEEP APNEA: Primary | ICD-10-CM

## 2023-09-05 DIAGNOSIS — R06.83 SNORING: ICD-10-CM

## 2023-09-05 DIAGNOSIS — E66.9 OBESITY (BMI 30-39.9): ICD-10-CM

## 2023-09-05 DIAGNOSIS — G47.8 SLEEP TALKING: ICD-10-CM

## 2023-09-05 NOTE — PROGRESS NOTES
CONSULT NOTE    Requested by:   Katiana Sandoval MD Little, Sarah I, MD      Chief Complaint   Patient presents with    Sleeping Problem    Consult       Subjective:  Vivien Rosa is a 43 y.o. female.   Patient came in today for evaluation of possible sleep apnea. Patient endorses snoring. she also says that she has been tired and has fatigue during the day, for the past few years. The patient's family notes that she has occasional pauses in the breathing.     The patient says that she rarely gets restful night sleep and her quality has diminished considerably. she does have a tendency to feel sleepy while watching TV and reading a book.      The patient has been told that she occasionally talks in her sleep.     she is complaining of occasional headaches. There is known family history of sleep apnea, in her sibling (brother)    her Epsworth Sleepiness score was 6-10 /24.     Patient suffers from hypertension.      she drinks 1-2 cups/cans of caffeinated drinks per day.      The following portions of the patient's history were reviewed and updated as appropriate: allergies, current medications, past family history, past medical history, past social history, and past surgical history.    Review of Systems   HENT:  Negative for sinus pressure, sneezing and sore throat.    Respiratory:  Negative for cough, chest tightness, shortness of breath and wheezing.    Cardiovascular:  Positive for leg swelling. Negative for palpitations.   Psychiatric/Behavioral:  Positive for sleep disturbance.    All other systems reviewed and are negative.    Past Medical History:   Diagnosis Date    Allergic     Anemia     Arm fracture 1985    Diverticulosis 2019    Esophagitis     History of blood transfusion 2014    post-partum    Vertigo        Social History     Tobacco Use    Smoking status: Never     Passive exposure: Never    Smokeless tobacco: Never   Substance Use Topics    Alcohol use: No         Objective:  Visit  "Vitals  /83   Pulse 87   Resp 18   Ht 157.5 cm (62\")   Wt 82.6 kg (182 lb 3.2 oz)   SpO2 99%   BMI 33.32 kg/m²       Physical Exam  Vitals reviewed.   Constitutional:       Appearance: She is well-developed.   HENT:      Head: Atraumatic.      Mouth/Throat:      Comments: Oropharynx was crowded.  Pulmonary:      Effort: Pulmonary effort is normal. No respiratory distress.   Musculoskeletal:      Comments: Gait was normal.   Skin:     General: Skin is warm and dry.   Neurological:      General: No focal deficit present.      Mental Status: She is alert and oriented to person, place, and time.   Psychiatric:         Mood and Affect: Mood normal.         Behavior: Behavior normal.           Assessment/Plan:  Diagnoses and all orders for this visit:    1. Obstructive sleep apnea (Primary)  -     Home Sleep Study; Future    2. Excessive daytime sleepiness  -     Home Sleep Study; Future    3. Snoring  -     Home Sleep Study; Future    4. Obesity (BMI 30-39.9)  -     Home Sleep Study; Future    5. Sleep talking  -     Home Sleep Study; Future        Return in about 7 months (around 3/28/2024) for Shirley/Ada, ...Also 14 mths w/Ada , [place].    DISCUSSION(if any):  Laboratory workup was also reviewed which showed   Lab Results   Component Value Date    CO2 25.6 06/27/2023   ,   Lab Results   Component Value Date    HGBA1C 5.90 (H) 06/27/2023    HGBA1C 6.10 (H) 06/09/2021    HGBA1C 5.5 11/12/2018     Laboratory workup also showed   Lab Results   Component Value Date    HGB 12.4 06/27/2023    HGB 12.8 06/09/2021    HGB 13.3 06/09/2020   ,   Lab Results   Component Value Date    HCT 38.0 06/27/2023    HCT 40.5 06/09/2021    HCT 41.1 06/09/2020   ,   Lab Results   Component Value Date    TSH 2.100 06/27/2023    TSH 1.440 06/09/2021    TSH 1.530 11/12/2018       Referring physicians office note was also reviewed that did mention daytime sleepiness, snoring & possible sleep apnea    ===========================  " ===========================    Sleep questionnaire was provided to the patient    The pathophysiology of sleep apnea was discussed, with the patient.     We will encourage her to schedule the sleep study soon.     The patient was made aware of the limitation of the home sleep study, whereby it may underestimate the true AHI and also carries a low sensitivity.  I have informed her that even if the home sleep study is negative, we may suggest an in lab sleep study to completely and definitively rule out/in sleep apnea.  The patient has understood.  This was communicated to the patient, in case home study is to be requested.    The patient is agreeable to try CPAP/BiPAP, if needed.     Patient was educated on good sleep hygiene measures and voiced understanding of the same.     Patient was given reading material regarding sleep apnea    Patient was counseled regarding weight loss.       Dictated utilizing Dragon dictation.    This document was electronically signed by Joe Zuluaga MD on 09/05/23 at 16:37 EDT

## 2023-10-02 ENCOUNTER — OFFICE VISIT (OUTPATIENT)
Dept: FAMILY MEDICINE CLINIC | Facility: CLINIC | Age: 43
End: 2023-10-02
Payer: COMMERCIAL

## 2023-10-02 VITALS
HEART RATE: 100 BPM | BODY MASS INDEX: 32.94 KG/M2 | WEIGHT: 179 LBS | SYSTOLIC BLOOD PRESSURE: 144 MMHG | OXYGEN SATURATION: 99 % | HEIGHT: 62 IN | DIASTOLIC BLOOD PRESSURE: 96 MMHG | TEMPERATURE: 97.2 F

## 2023-10-02 DIAGNOSIS — R73.03 PREDIABETES: ICD-10-CM

## 2023-10-02 DIAGNOSIS — I10 ESSENTIAL HYPERTENSION: Primary | ICD-10-CM

## 2023-10-02 DIAGNOSIS — E66.09 CLASS 1 OBESITY DUE TO EXCESS CALORIES WITHOUT SERIOUS COMORBIDITY WITH BODY MASS INDEX (BMI) OF 32.0 TO 32.9 IN ADULT: ICD-10-CM

## 2023-10-02 DIAGNOSIS — Z23 NEED FOR INFLUENZA VACCINATION: ICD-10-CM

## 2023-10-02 DIAGNOSIS — K76.0 FATTY LIVER: ICD-10-CM

## 2023-10-02 PROBLEM — R03.0 PREHYPERTENSION: Status: RESOLVED | Noted: 2023-06-27 | Resolved: 2023-10-02

## 2023-10-02 RX ORDER — VALSARTAN AND HYDROCHLOROTHIAZIDE 80; 12.5 MG/1; MG/1
1 TABLET, FILM COATED ORAL DAILY
Qty: 90 TABLET | Refills: 0 | Status: SHIPPED | OUTPATIENT
Start: 2023-10-02

## 2023-10-02 NOTE — PROGRESS NOTES
Subjective   Vivien Rosa is a 43 y.o. female.     History of Present Illness  Here for f/u on chronic med problems.    Has HTN, currently on hctz. Swelling improved. BP running high. Generally SBP in 90s.    Cormobid conditions include obesity, prediabetes, fatty liver. Staying active but excess calories. Down 5 lbs in last month.    The following portions of the patient's history were reviewed and updated as appropriate: allergies, current medications, past family history, past medical history, past social history, past surgical history, and problem list.    Review of Systems   Constitutional:  Positive for fatigue. Negative for fever.   HENT:  Positive for congestion (mild) and postnasal drip. Negative for mouth sores, nosebleeds, sore throat and trouble swallowing.    Eyes:  Negative for pain, discharge and redness.   Respiratory:  Negative for cough, shortness of breath and wheezing.    Cardiovascular:  Positive for leg swelling (mild). Negative for chest pain and palpitations.   Gastrointestinal:  Positive for abdominal pain (chronic, intermittent lower mid and LLQ), constipation (occasional) and diarrhea (occasional). Negative for blood in stool, nausea and vomiting.   Genitourinary:  Negative for dysuria, genital sores, hematuria, menstrual problem, pelvic pain, urgency and vaginal discharge.   Musculoskeletal:  Positive for arthralgias.   Skin:  Negative for rash.   Neurological:  Negative for dizziness, syncope, weakness and headaches.   Hematological:  Negative for adenopathy. Does not bruise/bleed easily.   Psychiatric/Behavioral:  Positive for sleep disturbance. Negative for confusion and dysphoric mood.    Pt's previous ROS reviewed and updated as indicated.     Objective   Vitals:    10/02/23 0948   BP: 144/96   Pulse: 100   Temp: 97.2 °F (36.2 °C)   SpO2: 99%     Body mass index is 32.74 kg/m².      10/02/23  0948   Weight: 81.2 kg (179 lb)       Physical Exam  Vitals and nursing note reviewed.    Constitutional:       General: She is not in acute distress.     Appearance: She is well-developed and well-groomed. She is obese. She is not ill-appearing.   HENT:      Head: Atraumatic.      Mouth/Throat:      Mouth: Mucous membranes are moist. No oral lesions.   Eyes:      General: No scleral icterus.  Neck:      Thyroid: No thyroid mass.   Cardiovascular:      Rate and Rhythm: Normal rate and regular rhythm.      Pulses: Normal pulses.      Heart sounds: Normal heart sounds.   Pulmonary:      Effort: Pulmonary effort is normal.      Breath sounds: Normal breath sounds.   Musculoskeletal:      Right lower leg: No edema.      Left lower leg: No edema.   Lymphadenopathy:      Cervical: No cervical adenopathy.   Skin:     General: Skin is warm and dry.      Coloration: Skin is not jaundiced or pale.      Findings: No bruising or rash.   Neurological:      Mental Status: She is alert and oriented to person, place, and time. She is disoriented.   Psychiatric:         Behavior: Behavior normal. Behavior is cooperative.         Cognition and Memory: Cognition normal.   Pt's previous physical exam reviewed and updated as indicated.    Lab Results   Component Value Date    WBC 7.40 06/27/2023    HGB 12.4 06/27/2023    HCT 38.0 06/27/2023    MCV 86.6 06/27/2023     (H) 06/27/2023       Lab Results   Component Value Date    GLUCOSE 107 (H) 06/27/2023    BUN 9 06/27/2023    CREATININE 0.82 06/27/2023    EGFRIFNONA 121 06/09/2021    EGFRIFAFRI 147 06/09/2021    BCR 11.0 06/27/2023    K 3.9 06/27/2023    CO2 25.6 06/27/2023    CALCIUM 9.6 06/27/2023    PROTENTOTREF 7.0 06/27/2023    ALBUMIN 4.6 06/27/2023    LABIL2 1.9 06/27/2023    AST 30 06/27/2023    ALT 43 (H) 06/27/2023       Lab Results   Component Value Date    CHLPL 197 06/27/2023    TRIG 185 (H) 06/27/2023    HDL 57 06/27/2023     (H) 06/27/2023     Lab Results   Component Value Date    TSH 2.100 06/27/2023     Lab Results   Component Value Date     HGBA1C 5.90 (H) 06/27/2023    HGBA1C 6.10 (H) 06/09/2021    HGBA1C 5.5 11/12/2018     Lab Results   Component Value Date    CREATININE 0.82 06/27/2023         Assessment & Plan   Diagnoses and all orders for this visit:    1. Essential hypertension (Primary)  -     valsartan-hydrochlorothiazide (Diovan HCT) 80-12.5 MG per tablet; Take 1 tablet by mouth Daily.  Dispense: 90 tablet; Refill: 0    2. Prediabetes    3. Fatty liver    4. Need for influenza vaccination    5. Class 1 obesity due to excess calories without serious comorbidity with body mass index (BMI) of 32.0 to 32.9 in adult    Other orders  -     Fluzone (or Fluarix & Flulaval for VFC) >6 Mos ([id number])       HTN not yet controlled. I have reviewed risks/benefits and potential side effects of various treatment options. Pt voices understanding and wishes to proceed with Diovan HCT.     Pt advised to eat a heart healthy diet and get regular aerobic exercise.    Diabetes prevention reviewed.    Fu in 6 weeks, sooner as needed.  Patient was encouraged to keep me informed of any acute changes, lack of improvement, or any new concerning symptoms.  Pt is aware of reasons to seek emergent care.  Patient voiced understanding of all instructions and denied further questions.    Please note that portions of this note may have been completed with a voice recognition program.

## 2023-10-16 ENCOUNTER — HOSPITAL ENCOUNTER (OUTPATIENT)
Dept: MAMMOGRAPHY | Facility: HOSPITAL | Age: 43
Discharge: HOME | End: 2023-10-16
Admitting: FAMILY MEDICINE
Payer: COMMERCIAL

## 2023-10-16 DIAGNOSIS — Z12.31 ENCOUNTER FOR SCREENING MAMMOGRAM FOR MALIGNANT NEOPLASM OF BREAST: ICD-10-CM

## 2023-10-16 PROCEDURE — 77067 SCR MAMMO BI INCL CAD: CPT

## 2023-10-16 PROCEDURE — 77063 BREAST TOMOSYNTHESIS BI: CPT

## 2023-10-19 DIAGNOSIS — I10 PRIMARY HYPERTENSION: ICD-10-CM

## 2023-10-19 DIAGNOSIS — I10 ESSENTIAL HYPERTENSION: ICD-10-CM

## 2023-10-19 RX ORDER — HYDROCHLOROTHIAZIDE 25 MG/1
25 TABLET ORAL DAILY
Qty: 90 TABLET | OUTPATIENT
Start: 2023-10-19

## 2023-10-19 NOTE — TELEPHONE ENCOUNTER
Rx Refill Note  Requested Prescriptions     Refused Prescriptions Disp Refills    hydroCHLOROthiazide (HYDRODIURIL) 25 MG tablet [Pharmacy Med Name: HYDROCHLOROTHIAZIDE 25MG TABLETS] 90 tablet      Sig: TAKE 1 TABLET BY MOUTH DAILY     Refused By: SASHA SAMUEL     Reason for Refusal: Refill not appropriate      Last office visit with prescribing clinician: 8/23/2023   Last telemedicine visit with prescribing clinician: Visit date not found   Next office visit with prescribing clinician: Visit date not found     Sasha Samuel MA  10/19/23, 09:17 EDT

## 2023-11-15 ENCOUNTER — OFFICE VISIT (OUTPATIENT)
Dept: FAMILY MEDICINE CLINIC | Facility: CLINIC | Age: 43
End: 2023-11-15
Payer: COMMERCIAL

## 2023-11-15 VITALS
OXYGEN SATURATION: 99 % | HEART RATE: 78 BPM | WEIGHT: 181.2 LBS | DIASTOLIC BLOOD PRESSURE: 90 MMHG | SYSTOLIC BLOOD PRESSURE: 130 MMHG | HEIGHT: 62 IN | TEMPERATURE: 98.2 F | BODY MASS INDEX: 33.34 KG/M2

## 2023-11-15 DIAGNOSIS — K57.92 DIVERTICULITIS: ICD-10-CM

## 2023-11-15 DIAGNOSIS — R73.03 PREDIABETES: ICD-10-CM

## 2023-11-15 DIAGNOSIS — R10.32 LLQ PAIN: Primary | ICD-10-CM

## 2023-11-15 DIAGNOSIS — E66.09 CLASS 1 OBESITY DUE TO EXCESS CALORIES WITHOUT SERIOUS COMORBIDITY WITH BODY MASS INDEX (BMI) OF 33.0 TO 33.9 IN ADULT: ICD-10-CM

## 2023-11-15 DIAGNOSIS — I10 ESSENTIAL HYPERTENSION: ICD-10-CM

## 2023-11-15 PROCEDURE — 99214 OFFICE O/P EST MOD 30 MIN: CPT | Performed by: FAMILY MEDICINE

## 2023-11-15 RX ORDER — CIPROFLOXACIN 500 MG/1
500 TABLET, FILM COATED ORAL 2 TIMES DAILY
Qty: 14 TABLET | Refills: 0 | Status: SHIPPED | OUTPATIENT
Start: 2023-11-15 | End: 2023-11-22

## 2023-11-15 RX ORDER — HYDROCHLOROTHIAZIDE 25 MG/1
TABLET ORAL
Qty: 30 TABLET | Refills: 2 | Status: SHIPPED | OUTPATIENT
Start: 2023-11-15

## 2023-11-15 RX ORDER — METRONIDAZOLE 500 MG/1
500 TABLET ORAL 3 TIMES DAILY
Qty: 21 TABLET | Refills: 0 | Status: SHIPPED | OUTPATIENT
Start: 2023-11-15 | End: 2023-11-22

## 2023-11-15 NOTE — PROGRESS NOTES
Subjective   Vivien Rosa is a 43 y.o. female.     History of Present Illness  Presents today for follow-up on several chronic medical problems.    Previously on HCTZ only for hypertension.  At last visit switched to Diovan HCT due to poor control.  Has been taking as prescribed.  Denies side effects.  Blood pressure somewhat up today she feels due to pain (see below).  She does feel she has increased swelling compared to full dose of HCTZ alone.    Obesity with associated prediabetes, hyperinsulinemia and family history of diabetes.  Struggling to follow diabetic appropriate diet, lose weight.    Previous diagnosis of diverticulitis.  Has intermittent bouts of left lower quadrant pain.  Increased difficulty over the weekend.  No particular trigger noted.  Has had consultation with Dr. Esparza for hemorrhoids.  Some discussion of possible lower endoscopy.  No fever.  Has had some bright red blood per rectum not typical for her hemorrhoids.  Reports abdominal pain better today than over the weekend.    The following portions of the patient's history were reviewed and updated as appropriate: allergies, current medications, past family history, past medical history, past social history, past surgical history, and problem list.    Review of Systems   Constitutional:  Positive for fatigue. Negative for fever.   HENT:  Positive for congestion (mild) and postnasal drip. Negative for mouth sores, nosebleeds, sore throat and trouble swallowing.    Eyes:  Negative for pain, discharge and redness.   Respiratory:  Negative for cough, shortness of breath and wheezing.    Cardiovascular:  Positive for leg swelling (mild). Negative for chest pain and palpitations.   Gastrointestinal:  Positive for abdominal pain (chronic, intermittent lower mid and LLQ), constipation (occasional) and diarrhea (occasional). Negative for blood in stool, nausea and vomiting.   Genitourinary:  Negative for dysuria, genital sores, hematuria,  menstrual problem, pelvic pain, urgency and vaginal discharge.   Musculoskeletal:  Positive for arthralgias.   Skin:  Negative for rash.   Neurological:  Negative for dizziness, syncope, weakness and headaches.   Hematological:  Negative for adenopathy. Does not bruise/bleed easily.   Psychiatric/Behavioral:  Positive for sleep disturbance. Negative for confusion and dysphoric mood.    Pt's previous ROS reviewed and updated as indicated.       Objective   Vitals:    11/15/23 1544   BP: 130/90   Pulse: 78   Temp: 98.2 °F (36.8 °C)   SpO2: 99%     Body mass index is 33.14 kg/m².      11/15/23  1544   Weight: 82.2 kg (181 lb 3.2 oz)       Physical Exam  Vitals and nursing note reviewed.   Constitutional:       General: She is not in acute distress.     Appearance: She is well-developed and well-groomed. She is obese. She is not ill-appearing.   HENT:      Head: Atraumatic.      Mouth/Throat:      Mouth: Mucous membranes are moist.   Eyes:      General: No scleral icterus.  Cardiovascular:      Rate and Rhythm: Normal rate and regular rhythm.      Pulses: Normal pulses.      Heart sounds: Normal heart sounds.   Pulmonary:      Effort: Pulmonary effort is normal.      Breath sounds: Normal breath sounds.   Abdominal:      General: Bowel sounds are normal. There is no distension.      Palpations: Abdomen is soft. There is no mass.      Tenderness: There is abdominal tenderness (mild) in the left lower quadrant. There is no guarding or rebound.   Musculoskeletal:      Right lower leg: No edema.      Left lower leg: No edema.   Skin:     General: Skin is warm and dry.      Coloration: Skin is not jaundiced or pale.      Findings: No bruising or rash.   Neurological:      Mental Status: She is alert and oriented to person, place, and time. Mental status is at baseline.   Psychiatric:         Behavior: Behavior normal. Behavior is cooperative.         Cognition and Memory: Cognition normal.     Pt's previous physical exam  reviewed and updated as indicated.      Assessment & Plan   Diagnoses and all orders for this visit:    1. LLQ pain (Primary)  -     CBC & Differential  -     Comprehensive Metabolic Panel  -     C-reactive protein  -     ciprofloxacin (Cipro) 500 MG tablet; Take 1 tablet by mouth 2 (Two) Times a Day for 7 days.  Dispense: 14 tablet; Refill: 0  -     metroNIDAZOLE (Flagyl) 500 MG tablet; Take 1 tablet by mouth 3 (Three) Times a Day for 7 days.  Dispense: 21 tablet; Refill: 0  -     Ambulatory Referral to General Surgery    2. Prediabetes  -     Hemoglobin A1c    3. Essential hypertension    4. Diverticulitis  -     Ambulatory Referral to General Surgery    5. Class 1 obesity due to excess calories without serious comorbidity with body mass index (BMI) of 33.0 to 33.9 in adult    Other orders  -     hydroCHLOROthiazide (HYDRODIURIL) 25 MG tablet; 1 po daily as needed for swelling  Dispense: 30 tablet; Refill: 2       Hypertension generally improved.  BP mildly elevated today due to pain.  Continue Diovan HCTZ.  Additional HCTZ for worsened swelling. Pt advised to eat a heart healthy diet and get regular aerobic exercise.    Obesity with associated prediabetes, hyperinsulinemia-recheck A1C, treat as indicated.  Encouraged diabetic appropriate diet increase physical activity, weight loss.    Left lower quadrant pain with intermittent episodes of diverticulitis.  Improving course from over the weekend.  Have provided Cipro and Flagyl for her to have on hand should she have an acute flare but does not resolve on its own.  Refer to general surgery for lower endoscopy.    Routine follow-up in 3 months, sooner as needed/instructed.  I will contact patient regarding test results and provide instructions regarding any necessary changes in plan of care.  Patient was encouraged to keep me informed of any acute changes, lack of improvement, or any new concerning symptoms.  Pt is aware of reasons to seek emergent care.  Patient  voiced understanding of all instructions and denied further questions.    Please note that portions of this note may have been completed with a voice recognition program.

## 2023-11-16 LAB
ALBUMIN SERPL-MCNC: 4.3 G/DL (ref 3.9–4.9)
ALBUMIN/GLOB SERPL: 1.5 {RATIO} (ref 1.2–2.2)
ALP SERPL-CCNC: 81 IU/L (ref 44–121)
ALT SERPL-CCNC: 31 IU/L (ref 0–32)
AST SERPL-CCNC: 33 IU/L (ref 0–40)
BASOPHILS # BLD AUTO: 0.1 X10E3/UL (ref 0–0.2)
BASOPHILS NFR BLD AUTO: 1 %
BILIRUB SERPL-MCNC: <0.2 MG/DL (ref 0–1.2)
BUN SERPL-MCNC: 10 MG/DL (ref 6–24)
BUN/CREAT SERPL: 13 (ref 9–23)
CALCIUM SERPL-MCNC: 9.5 MG/DL (ref 8.7–10.2)
CHLORIDE SERPL-SCNC: 98 MMOL/L (ref 96–106)
CO2 SERPL-SCNC: 27 MMOL/L (ref 20–29)
CREAT SERPL-MCNC: 0.79 MG/DL (ref 0.57–1)
CRP SERPL-MCNC: 11 MG/L (ref 0–10)
EGFRCR SERPLBLD CKD-EPI 2021: 95 ML/MIN/1.73
EOSINOPHIL # BLD AUTO: 0.2 X10E3/UL (ref 0–0.4)
EOSINOPHIL NFR BLD AUTO: 3 %
ERYTHROCYTE [DISTWIDTH] IN BLOOD BY AUTOMATED COUNT: 12.8 % (ref 11.7–15.4)
GLOBULIN SER CALC-MCNC: 2.8 G/DL (ref 1.5–4.5)
GLUCOSE SERPL-MCNC: 71 MG/DL (ref 70–99)
HBA1C MFR BLD: 6.6 % (ref 4.8–5.6)
HCT VFR BLD AUTO: 37.9 % (ref 34–46.6)
HGB BLD-MCNC: 12.4 G/DL (ref 11.1–15.9)
IMM GRANULOCYTES # BLD AUTO: 0 X10E3/UL (ref 0–0.1)
IMM GRANULOCYTES NFR BLD AUTO: 0 %
LYMPHOCYTES # BLD AUTO: 3.3 X10E3/UL (ref 0.7–3.1)
LYMPHOCYTES NFR BLD AUTO: 35 %
MCH RBC QN AUTO: 28.6 PG (ref 26.6–33)
MCHC RBC AUTO-ENTMCNC: 32.7 G/DL (ref 31.5–35.7)
MCV RBC AUTO: 88 FL (ref 79–97)
MONOCYTES # BLD AUTO: 0.9 X10E3/UL (ref 0.1–0.9)
MONOCYTES NFR BLD AUTO: 9 %
NEUTROPHILS # BLD AUTO: 5 X10E3/UL (ref 1.4–7)
NEUTROPHILS NFR BLD AUTO: 52 %
PLATELET # BLD AUTO: 483 X10E3/UL (ref 150–450)
POTASSIUM SERPL-SCNC: 3.7 MMOL/L (ref 3.5–5.2)
PROT SERPL-MCNC: 7.1 G/DL (ref 6–8.5)
RBC # BLD AUTO: 4.33 X10E6/UL (ref 3.77–5.28)
SODIUM SERPL-SCNC: 140 MMOL/L (ref 134–144)
WBC # BLD AUTO: 9.4 X10E3/UL (ref 3.4–10.8)

## 2023-11-22 ENCOUNTER — HOSPITAL ENCOUNTER (OUTPATIENT)
Dept: SLEEP MEDICINE | Facility: HOSPITAL | Age: 43
Discharge: HOME | End: 2023-11-22
Admitting: INTERNAL MEDICINE
Payer: COMMERCIAL

## 2023-11-22 DIAGNOSIS — G47.19 EXCESSIVE DAYTIME SLEEPINESS: ICD-10-CM

## 2023-11-22 DIAGNOSIS — R06.83 SNORING: ICD-10-CM

## 2023-11-22 DIAGNOSIS — G47.8 SLEEP TALKING: ICD-10-CM

## 2023-11-22 DIAGNOSIS — E66.9 OBESITY (BMI 30-39.9): ICD-10-CM

## 2023-11-22 DIAGNOSIS — G47.33 OBSTRUCTIVE SLEEP APNEA: ICD-10-CM

## 2023-11-22 PROCEDURE — 95806 SLEEP STUDY UNATT&RESP EFFT: CPT

## 2023-12-06 NOTE — PROGRESS NOTES
Roman Rosa is a 43 y.o. female.   No chief complaint on file.       History of Present Illness The patient is in the office today for evaluation and treatment of   Left lower quadrant pain. The patient was recently diagnosed with diverticulitis. The patient states *** .     {Common H&P Review Areas:32267}    Review of Systems   Constitutional:  Negative for diaphoresis, unexpected weight gain and unexpected weight loss.   HENT:  Negative for hearing loss, trouble swallowing and voice change.    Eyes:  Negative for visual disturbance.   Respiratory:  Negative for apnea, cough, chest tightness, shortness of breath and wheezing.    Cardiovascular:  Negative for chest pain, palpitations and leg swelling.   Gastrointestinal:  Positive for abdominal pain. Negative for abdominal distention, anal bleeding, blood in stool, constipation, diarrhea, nausea, rectal pain, vomiting, GERD and indigestion.   Endocrine: Negative for cold intolerance and heat intolerance.   Genitourinary:  Negative for difficulty urinating, dysuria and flank pain.   Musculoskeletal:  Negative for back pain and gait problem.   Skin:  Negative for color change, rash, skin lesions and wound.   Neurological:  Negative for dizziness, syncope, speech difficulty, weakness, light-headedness and numbness.   Hematological:  Negative for adenopathy. Does not bruise/bleed easily.   Psychiatric/Behavioral:  The patient is not nervous/anxious.        Objective   Physical Exam      Assessment & Plan   {Assess/PlanSmartLinks:27272}

## 2023-12-08 NOTE — PROGRESS NOTES
Patient: Vivien Rosa    YOB: 1980    Date: 12/11/2023    Primary Care Provider: Katiana Sandoval MD    Chief Complaint   Patient presents with    Abdominal Pain     LLQ        SUBJECTIVE:    History of present illness:  I saw the patient in the office today as a consultation for evaluation and treatment of left lower quadrant pain.  Patient states she has had pain for over 5 years and its progressively gotten worse. Complains of both constipation and diarrhea but no nausea or vomiting.  Patient had hemorrhoid banding and hemorrhoidectomy in the past, has persistent symptoms with hemorrhoids and bleeding.  Last colonoscopy was 5 years ago.  Patient also concerned about left lower quadrant pain has been consistent now for over a year.  Concerned about diverticulosis.    The following portions of the patient's history were reviewed and updated as appropriate: allergies, current medications, past family history, past medical history, past social history, past surgical history and problem list.    Review of Systems   Constitutional:  Negative for chills, fever and unexpected weight change.   HENT:  Negative for hearing loss, trouble swallowing and voice change.    Eyes:  Negative for visual disturbance.   Respiratory:  Negative for apnea, cough, chest tightness, shortness of breath and wheezing.    Cardiovascular:  Negative for chest pain, palpitations and leg swelling.   Gastrointestinal:  Positive for abdominal pain. Negative for abdominal distention, anal bleeding, blood in stool, constipation, diarrhea, nausea, rectal pain and vomiting.   Endocrine: Negative for cold intolerance and heat intolerance.   Genitourinary:  Negative for difficulty urinating, dysuria and flank pain.   Musculoskeletal:  Negative for back pain and gait problem.   Skin:  Negative for color change, rash and wound.   Neurological:  Negative for dizziness, syncope, speech difficulty, weakness, light-headedness, numbness and  headaches.   Hematological:  Negative for adenopathy. Does not bruise/bleed easily.   Psychiatric/Behavioral:  Negative for confusion. The patient is not nervous/anxious.        History:  Past Medical History:   Diagnosis Date    Allergic     Anemia     Arm fracture 1985    Diverticulosis 2019    Esophagitis     History of blood transfusion 2014    post-partum    Vertigo        Past Surgical History:   Procedure Laterality Date    HEMORRHOID BANDING  2015    MENG'S NEUROMA EXCISION Left 2009    TUBAL ABDOMINAL LIGATION      VARICOSE VEIN SURGERY         Family History   Problem Relation Age of Onset    Hypertension Mother         No meds    COPD Mother     Heart disease Mother     Sjogren's syndrome Mother     Heart disease Father     Heart attack Father     Thyroid disease Father         I don't know much about him personally,  heavy drinker, heavy smoker didn't help    Alcohol abuse Father     Asperger's syndrome Sister     Depression Sister     Hypertension Brother     Stroke Maternal Grandmother         Several mini strokes before passed away    Breast cancer Neg Hx        Social History     Tobacco Use    Smoking status: Never     Passive exposure: Never    Smokeless tobacco: Never   Vaping Use    Vaping Use: Never used   Substance Use Topics    Alcohol use: No    Drug use: No       Medications:   Current Outpatient Medications:     albuterol sulfate  (90 Base) MCG/ACT inhaler, INHALE 2 PUFFS BY MOUTH EVERY 4 HOURS AS NEEDED FOR WHEEZING OR SHORTNESS OF BREATH, Disp: 8.5 g, Rfl: 1    hydroCHLOROthiazide (HYDRODIURIL) 25 MG tablet, 1 po daily as needed for swelling, Disp: 30 tablet, Rfl: 2    Tri-Sprintec 0.18/0.215/0.25 MG-35 MCG per tablet, TAKE 1 TABLET BY MOUTH DAILY, Disp: 28 tablet, Rfl: 11    valsartan-hydrochlorothiazide (Diovan HCT) 80-12.5 MG per tablet, Take 1 tablet by mouth Daily., Disp: 90 tablet, Rfl: 0       Allergies: No Known Allergies    OBJECTIVE:    Vital Signs:  Vitals:    12/11/23  "1530   BP: 142/100   Pulse: 93   Temp: 97.7 °F (36.5 °C)   SpO2: 99%   Weight: 83.2 kg (183 lb 6.4 oz)   Height: 157.5 cm (62.01\")       Physical Exam:   General Appearance:    Alert, cooperative, in no acute distress   Head:    Normocephalic, without obvious abnormality, atraumatic   Eyes:            Lids and lashes normal, conjunctivae and sclerae normal, no   icterus, no pallor, corneas clear, PERRL   Ears:    Ears appear intact with no abnormalities noted   Throat:   No oral lesions, no thrush, oral mucosa moist   Neck:   No adenopathy, supple, trachea midline, no thyromegaly,  no JVD   Lungs:     Clear to auscultation,respirations regular, even and                  unlabored    Heart:    Regular rhythm and normal rate, normal S1 and S2, no            murmur   Abdomen:     no masses, no organomegaly, soft and tender in left lower quadrant to deep palpation only.  No abdominal hernias or masses.   Extremities:   Moves all extremities well, no edema, no cyanosis, no             redness   Pulses:   Pulses palpable and equal bilaterally   Skin:   No bleeding, bruising or rash   Lymph nodes:   No palpable adenopathy   Neurologic:   Cranial nerves 2 - 12 grossly intact, sensation intact  Psychiatric: No evidence of depression or anxiety   Results Review:   I reviewed the patient's new clinical results.    Review of Systems was reviewed and confirmed as accurate as documented by the MA.    ASSESSMENT/PLAN:    1. Rectal bleed    2. Internal hemorrhoids    3. Left lower quadrant abdominal pain        I recommend a colonoscopy with hemorrhoid banding for further evaluation. The procedure was explained as well as the risks which include but are not limited to bleeding, infection, perforation, abdominal pain etc. The patient understands these risks and the procedure and wishes to proceed.  Risk of recurrence also discussed and patient agreeable.  Continue high-fiber diet and Metamucil daily.     Electronically signed by " Huong Esparza MD  12/11/23  10:40 EST

## 2023-12-11 ENCOUNTER — OFFICE VISIT (OUTPATIENT)
Dept: SURGERY | Facility: CLINIC | Age: 43
End: 2023-12-11
Payer: COMMERCIAL

## 2023-12-11 VITALS
BODY MASS INDEX: 33.75 KG/M2 | WEIGHT: 183.4 LBS | SYSTOLIC BLOOD PRESSURE: 142 MMHG | HEIGHT: 62 IN | HEART RATE: 93 BPM | OXYGEN SATURATION: 99 % | TEMPERATURE: 97.7 F | DIASTOLIC BLOOD PRESSURE: 100 MMHG

## 2023-12-11 DIAGNOSIS — R10.32 LEFT LOWER QUADRANT ABDOMINAL PAIN: ICD-10-CM

## 2023-12-11 DIAGNOSIS — K64.8 INTERNAL HEMORRHOIDS: ICD-10-CM

## 2023-12-11 DIAGNOSIS — K62.5 RECTAL BLEED: Primary | ICD-10-CM

## 2023-12-11 PROCEDURE — 99203 OFFICE O/P NEW LOW 30 MIN: CPT | Performed by: SURGERY

## 2023-12-14 ENCOUNTER — TELEPHONE (OUTPATIENT)
Dept: SURGERY | Facility: CLINIC | Age: 43
End: 2023-12-14
Payer: COMMERCIAL

## 2023-12-14 RX ORDER — BISACODYL 5 MG/1
TABLET, DELAYED RELEASE ORAL
Qty: 4 TABLET | Refills: 0 | Status: SHIPPED | OUTPATIENT
Start: 2023-12-14

## 2023-12-14 RX ORDER — POLYETHYLENE GLYCOL 3350 17 G/17G
POWDER, FOR SOLUTION ORAL
Qty: 238 G | Refills: 0 | Status: SHIPPED | OUTPATIENT
Start: 2023-12-14

## 2023-12-19 ENCOUNTER — OUTSIDE FACILITY SERVICE (OUTPATIENT)
Dept: SURGERY | Facility: CLINIC | Age: 43
End: 2023-12-19
Payer: COMMERCIAL

## 2023-12-27 DIAGNOSIS — I10 ESSENTIAL HYPERTENSION: ICD-10-CM

## 2023-12-27 RX ORDER — VALSARTAN AND HYDROCHLOROTHIAZIDE 80; 12.5 MG/1; MG/1
1 TABLET, FILM COATED ORAL DAILY
Qty: 90 TABLET | Refills: 0 | Status: SHIPPED | OUTPATIENT
Start: 2023-12-27

## 2024-01-04 NOTE — PROGRESS NOTES
Patient: Vivien Rosa    YOB: 1980    Date: 01/05/2024    Primary Care Provider: Katiana Sandoval MD    Reason for Consultation: Follow-up colonoscopy    Chief Complaint   Patient presents with    Post-op Follow-up     COLON       History of present illness:  I saw the patient in the office today as a followup from their recent colonoscopy with hemorrhoid banding.  No specimen collected. They state that they have done well and are having no complaints.  Patient feeling much better, normal rectal bleeding.    The following portions of the patient's history were reviewed and updated as appropriate: allergies, current medications, past family history, past medical history, past social history, past surgical history and problem list.    Review of Systems   Constitutional:  Negative for chills, fever and unexpected weight change.   HENT:  Negative for hearing loss, trouble swallowing and voice change.    Eyes:  Negative for visual disturbance.   Respiratory:  Negative for apnea, cough, chest tightness, shortness of breath and wheezing.    Cardiovascular:  Negative for chest pain, palpitations and leg swelling.   Gastrointestinal:  Negative for abdominal distention, abdominal pain, anal bleeding, blood in stool, constipation, diarrhea, nausea, rectal pain and vomiting.   Endocrine: Negative for cold intolerance and heat intolerance.   Genitourinary:  Negative for difficulty urinating, dysuria and flank pain.   Musculoskeletal:  Negative for back pain and gait problem.   Skin:  Negative for color change, rash and wound.   Neurological:  Negative for dizziness, syncope, speech difficulty, weakness, light-headedness, numbness and headaches.   Hematological:  Negative for adenopathy. Does not bruise/bleed easily.   Psychiatric/Behavioral:  Negative for confusion. The patient is not nervous/anxious.        Allergies:  No Known Allergies    Medications:    Current Outpatient Medications:     albuterol  "sulfate  (90 Base) MCG/ACT inhaler, INHALE 2 PUFFS BY MOUTH EVERY 4 HOURS AS NEEDED FOR WHEEZING OR SHORTNESS OF BREATH, Disp: 8.5 g, Rfl: 1    hydroCHLOROthiazide (HYDRODIURIL) 25 MG tablet, 1 po daily as needed for swelling, Disp: 30 tablet, Rfl: 2    polyethylene glycol (MIRALAX) 17 GM/SCOOP powder, Mix 238g powder with 64 oz of clear liquid. Use as directed., Disp: 238 g, Rfl: 0    Tri-Sprintec 0.18/0.215/0.25 MG-35 MCG per tablet, TAKE 1 TABLET BY MOUTH DAILY, Disp: 28 tablet, Rfl: 11    valsartan-hydrochlorothiazide (DIOVAN-HCT) 80-12.5 MG per tablet, TAKE 1 TABLET BY MOUTH DAILY, Disp: 90 tablet, Rfl: 0    bisacodyl 5 MG EC tablet, Use as directed. (Patient not taking: Reported on 1/5/2024), Disp: 4 tablet, Rfl: 0    Vital Signs:  Vitals:    01/05/24 1515   BP: 130/92   Pulse: 83   Temp: 97.1 °F (36.2 °C)   SpO2: 98%   Weight: 83 kg (183 lb)   Height: 157.5 cm (62.01\")       Physical Exam:   General Appearance:    Alert, cooperative, in no acute distress   Abdomen:     no masses, no organomegaly, soft with minimal left lower quadrant tenderness.  No abdominal wall hernias or inguinal hernias present.   Chest:      Clear to ausculation            Cor:     Regular rate and rhythm    Results Review:   I reviewed the patient's new clinical results.    Assessment / Plan:    1. Rectal bleed    2. Internal hemorrhoids        I did discuss the situation with the patient today in the office and they have done well from their recent colonoscopy with hemorrhoid banding. I have released the patient back to normal activity.  I need to see the patient back in the office in 5 years and they will need to have repeat colonoscopy at that time.  Maintain a high-fiber diet.    Electronically signed by Huong Esparza MD  01/05/24                Answers submitted by the patient for this visit:  Other (Submitted on 1/5/2024)  Please describe your symptoms.: Follow up from scope and banding  Have you had these symptoms " before?: Yes  How long have you been having these symptoms?: 1-2 weeks  Please list any medications you are currently taking for this condition.: Follow up for scope and banding  Primary Reason for Visit (Submitted on 1/5/2024)  What is the primary reason for your visit?: Other

## 2024-01-05 ENCOUNTER — OFFICE VISIT (OUTPATIENT)
Dept: SURGERY | Facility: CLINIC | Age: 44
End: 2024-01-05
Payer: COMMERCIAL

## 2024-01-05 VITALS
SYSTOLIC BLOOD PRESSURE: 130 MMHG | DIASTOLIC BLOOD PRESSURE: 92 MMHG | WEIGHT: 183 LBS | OXYGEN SATURATION: 98 % | HEART RATE: 83 BPM | HEIGHT: 62 IN | TEMPERATURE: 97.1 F | BODY MASS INDEX: 33.68 KG/M2

## 2024-01-05 DIAGNOSIS — K64.8 INTERNAL HEMORRHOIDS: ICD-10-CM

## 2024-01-05 DIAGNOSIS — K62.5 RECTAL BLEED: Primary | ICD-10-CM

## 2024-01-05 PROCEDURE — 99212 OFFICE O/P EST SF 10 MIN: CPT | Performed by: SURGERY

## 2024-02-12 RX ORDER — HYDROCHLOROTHIAZIDE 25 MG/1
TABLET ORAL
Qty: 30 TABLET | Refills: 2 | Status: SHIPPED | OUTPATIENT
Start: 2024-02-12

## 2024-02-14 ENCOUNTER — OFFICE VISIT (OUTPATIENT)
Dept: FAMILY MEDICINE CLINIC | Facility: CLINIC | Age: 44
End: 2024-02-14
Payer: COMMERCIAL

## 2024-02-14 VITALS
WEIGHT: 180.13 LBS | DIASTOLIC BLOOD PRESSURE: 86 MMHG | BODY MASS INDEX: 33.15 KG/M2 | SYSTOLIC BLOOD PRESSURE: 140 MMHG | HEART RATE: 92 BPM | OXYGEN SATURATION: 98 % | HEIGHT: 62 IN

## 2024-02-14 DIAGNOSIS — K76.0 FATTY LIVER: ICD-10-CM

## 2024-02-14 DIAGNOSIS — I10 ESSENTIAL HYPERTENSION: ICD-10-CM

## 2024-02-14 DIAGNOSIS — E66.09 CLASS 1 OBESITY DUE TO EXCESS CALORIES WITH SERIOUS COMORBIDITY AND BODY MASS INDEX (BMI) OF 32.0 TO 32.9 IN ADULT: ICD-10-CM

## 2024-02-14 DIAGNOSIS — E11.9 TYPE 2 DIABETES MELLITUS WITHOUT COMPLICATION, WITHOUT LONG-TERM CURRENT USE OF INSULIN: Primary | ICD-10-CM

## 2024-02-14 PROBLEM — Z87.09 HISTORY OF ACUTE BRONCHITIS WITH BRONCHOSPASM: Status: RESOLVED | Noted: 2018-11-10 | Resolved: 2024-02-14

## 2024-02-15 PROBLEM — R73.03 PREDIABETES: Status: RESOLVED | Noted: 2021-06-10 | Resolved: 2024-02-15

## 2024-02-15 PROBLEM — E11.9 TYPE 2 DIABETES MELLITUS WITHOUT COMPLICATION, WITHOUT LONG-TERM CURRENT USE OF INSULIN: Status: ACTIVE | Noted: 2024-02-15

## 2024-02-15 LAB
ALBUMIN/CREAT UR: 8 MG/G CREAT (ref 0–29)
CREAT UR-MCNC: 134.4 MG/DL
HBA1C MFR BLD: 6.4 % (ref 4.8–5.6)
MICROALBUMIN UR-MCNC: 11.2 UG/ML

## 2024-02-21 RX ORDER — NORGESTIMATE AND ETHINYL ESTRADIOL 7DAYSX3 28
KIT ORAL
Qty: 28 TABLET | Refills: 11 | Status: SHIPPED | OUTPATIENT
Start: 2024-02-21

## 2024-02-22 DIAGNOSIS — E11.9 TYPE 2 DIABETES MELLITUS WITHOUT COMPLICATION, WITHOUT LONG-TERM CURRENT USE OF INSULIN: Primary | ICD-10-CM

## 2024-02-22 RX ORDER — METFORMIN HYDROCHLORIDE 500 MG/1
TABLET, EXTENDED RELEASE ORAL
Qty: 90 TABLET | Refills: 0 | Status: SHIPPED | OUTPATIENT
Start: 2024-02-22

## 2024-03-24 DIAGNOSIS — I10 ESSENTIAL HYPERTENSION: ICD-10-CM

## 2024-03-25 DIAGNOSIS — I10 ESSENTIAL HYPERTENSION: ICD-10-CM

## 2024-03-26 RX ORDER — VALSARTAN AND HYDROCHLOROTHIAZIDE 80; 12.5 MG/1; MG/1
1 TABLET, FILM COATED ORAL DAILY
Qty: 90 TABLET | Refills: 0 | OUTPATIENT
Start: 2024-03-26

## 2024-03-26 RX ORDER — VALSARTAN AND HYDROCHLOROTHIAZIDE 80; 12.5 MG/1; MG/1
1 TABLET, FILM COATED ORAL DAILY
Qty: 90 TABLET | Refills: 0 | Status: SHIPPED | OUTPATIENT
Start: 2024-03-26

## 2024-05-03 ENCOUNTER — HOSPITAL ENCOUNTER (OUTPATIENT)
Dept: NUTRITION | Facility: HOSPITAL | Age: 44
Discharge: HOME | End: 2024-05-03
Payer: COMMERCIAL

## 2024-05-03 PROCEDURE — 97802 MEDICAL NUTRITION INDIV IN: CPT

## 2024-05-08 NOTE — PATIENT INSTRUCTIONS
Premenstrual Dysphoric Disorder        Preventive Care 40-64 Years, Female  Preventive care refers to lifestyle choices and visits with your health care provider that can promote health and wellness.  What does preventive care include?    · A yearly physical exam. This is also called an annual well check.  · Dental exams once or twice a year.  · Routine eye exams. Ask your health care provider how often you should have your eyes checked.  · Personal lifestyle choices, including:  ? Daily care of your teeth and gums.  ? Regular physical activity.  ? Eating a healthy diet.  ? Avoiding tobacco and drug use.  ? Limiting alcohol use.  ? Practicing safe sex.  ? Taking low-dose aspirin daily starting at age 50.  ? Taking vitamin and mineral supplements as recommended by your health care provider.  What happens during an annual well check?  The services and screenings done by your health care provider during your annual well check will depend on your age, overall health, lifestyle risk factors, and family history of disease.  Counseling  Your health care provider may ask you questions about your:  · Alcohol use.  · Tobacco use.  · Drug use.  · Emotional well-being.  · Home and relationship well-being.  · Sexual activity.  · Eating habits.  · Work and work environment.  · Method of birth control.  · Menstrual cycle.  · Pregnancy history.  Screening  You may have the following tests or measurements:  · Height, weight, and BMI.  · Blood pressure.  · Lipid and cholesterol levels. These may be checked every 5 years, or more frequently if you are over 50 years old.  · Skin check.  · Lung cancer screening. You may have this screening every year starting at age 55 if you have a 30-pack-year history of smoking and currently smoke or have quit within the past 15 years.  · Colorectal cancer screening. All adults should have this screening starting at age 50 and continuing until age 75. Your health care provider may recommend  screening at age 45. You will have tests every 1-10 years, depending on your results and the type of screening test. People at increased risk should start screening at an earlier age. Screening tests may include:  ? Guaiac-based fecal occult blood testing.  ? Fecal immunochemical test (FIT).  ? Stool DNA test.  ? Virtual colonoscopy.  ? Sigmoidoscopy. During this test, a flexible tube with a tiny camera (sigmoidoscope) is used to examine your rectum and lower colon. The sigmoidoscope is inserted through your anus into your rectum and lower colon.  ? Colonoscopy. During this test, a long, thin, flexible tube with a tiny camera (colonoscope) is used to examine your entire colon and rectum.  · Hepatitis C blood test.  · Hepatitis B blood test.  · Sexually transmitted disease (STD) testing.  · Diabetes screening. This is done by checking your blood sugar (glucose) after you have not eaten for a while (fasting). You may have this done every 1-3 years.  · Mammogram. This may be done every 1-2 years. Talk to your health care provider about when you should start having regular mammograms. This may depend on whether you have a family history of breast cancer.  · BRCA-related cancer screening. This may be done if you have a family history of breast, ovarian, tubal, or peritoneal cancers.  · Pelvic exam and Pap test. This may be done every 3 years starting at age 21. Starting at age 30, this may be done every 5 years if you have a Pap test in combination with an HPV test.  · Bone density scan. This is done to screen for osteoporosis. You may have this scan if you are at high risk for osteoporosis.  Discuss your test results, treatment options, and if necessary, the need for more tests with your health care provider.  Vaccines  Your health care provider may recommend certain vaccines, such as:  · Influenza vaccine. This is recommended every year.  · Tetanus, diphtheria, and acellular pertussis (Tdap, Td) vaccine. You may need a  Td booster every 10 years.  · Varicella vaccine. You may need this if you have not been vaccinated.  · Zoster vaccine. You may need this after age 60.  · Measles, mumps, and rubella (MMR) vaccine. You may need at least one dose of MMR if you were born in 1957 or later. You may also need a second dose.  · Pneumococcal 13-valent conjugate (PCV13) vaccine. You may need this if you have certain conditions and were not previously vaccinated.  · Pneumococcal polysaccharide (PPSV23) vaccine. You may need one or two doses if you smoke cigarettes or if you have certain conditions.  · Meningococcal vaccine. You may need this if you have certain conditions.  · Hepatitis A vaccine. You may need this if you have certain conditions or if you travel or work in places where you may be exposed to hepatitis A.  · Hepatitis B vaccine. You may need this if you have certain conditions or if you travel or work in places where you may be exposed to hepatitis B.  · Haemophilus influenzae type b (Hib) vaccine. You may need this if you have certain conditions.  Talk to your health care provider about which screenings and vaccines you need and how often you need them.  This information is not intended to replace advice given to you by your health care provider. Make sure you discuss any questions you have with your health care provider.  Document Released: 01/13/2017 Document Revised: 02/07/2019 Document Reviewed: 10/18/2016  [employer] Patient Education © 2019 [employer]     [FreeTextEntry1] : 47 yo WM, here for assessment for sudden hearing loss to his left ear that began 5/3/24 associated with some dizziness. Was in a meeting when it occurred. Denies any sudden loud noises.  Seen by ENT on 5/6/24 who put pt on po prednisone/meclizine and referred pt here for evaluation for HBOt therapy. going for an MRI on 5/8/24. Has not started meclizine because of side effect of drowsiness.      Discussed the proced. of HBO, its purpose and potential side effects including barotrauma, oxygen toxicity/seizures, nasal sinus pain, and decrease in vision usually temporary.       Nurses  Note:  KI ORTEGA is being seen for a initial nursing assessment visit. Pt. into [facility] for sudden hearing loss in the Left Ear, which occurred mid-day on 5/3/24, followed by dizziness the following day. Pt. seen by Otolaryngology on 5/6/24, and referred here for Hyperbaric Oxygen Therapy. Currently taking Prednisone daily for the hearing loss, and Meclizine HCL 12.5mg prn for dizziness. Patient having an MRI of the brain 5/8/24. Patient accompanied by self.

## 2024-05-10 DIAGNOSIS — E11.9 TYPE 2 DIABETES MELLITUS WITHOUT COMPLICATION, WITHOUT LONG-TERM CURRENT USE OF INSULIN: ICD-10-CM

## 2024-05-10 RX ORDER — METFORMIN HYDROCHLORIDE 500 MG/1
TABLET, EXTENDED RELEASE ORAL
Qty: 90 TABLET | Refills: 0 | Status: SHIPPED | OUTPATIENT
Start: 2024-05-10

## 2024-05-10 RX ORDER — HYDROCHLOROTHIAZIDE 25 MG/1
TABLET ORAL
Qty: 30 TABLET | Refills: 2 | Status: SHIPPED | OUTPATIENT
Start: 2024-05-10

## 2024-05-15 ENCOUNTER — OFFICE VISIT (OUTPATIENT)
Dept: FAMILY MEDICINE CLINIC | Facility: CLINIC | Age: 44
End: 2024-05-15
Payer: COMMERCIAL

## 2024-05-15 VITALS
HEART RATE: 80 BPM | OXYGEN SATURATION: 99 % | WEIGHT: 176.2 LBS | HEIGHT: 62 IN | SYSTOLIC BLOOD PRESSURE: 140 MMHG | BODY MASS INDEX: 32.42 KG/M2 | DIASTOLIC BLOOD PRESSURE: 88 MMHG

## 2024-05-15 DIAGNOSIS — Z12.11 SCREEN FOR COLON CANCER: ICD-10-CM

## 2024-05-15 DIAGNOSIS — Z01.84 IMMUNITY STATUS TESTING: ICD-10-CM

## 2024-05-15 DIAGNOSIS — E11.9 TYPE 2 DIABETES MELLITUS WITHOUT COMPLICATION, WITHOUT LONG-TERM CURRENT USE OF INSULIN: Primary | ICD-10-CM

## 2024-05-15 DIAGNOSIS — J45.20 MILD INTERMITTENT EXTRINSIC ASTHMA WITHOUT COMPLICATION: ICD-10-CM

## 2024-05-15 DIAGNOSIS — K76.0 FATTY LIVER: ICD-10-CM

## 2024-05-15 DIAGNOSIS — I10 ESSENTIAL HYPERTENSION: ICD-10-CM

## 2024-05-15 PROCEDURE — 99214 OFFICE O/P EST MOD 30 MIN: CPT | Performed by: FAMILY MEDICINE

## 2024-05-15 RX ORDER — MONTELUKAST SODIUM 10 MG/1
10 TABLET ORAL NIGHTLY
Qty: 30 TABLET | Refills: 2 | Status: SHIPPED | OUTPATIENT
Start: 2024-05-15

## 2024-05-15 NOTE — PROGRESS NOTES
Subjective   Vivien Rosa is a 44 y.o. female.     History of Present Illness  Here for f/u on chronic medical problems.    At last visit, dx'd with NIDDM. Since that time, has been taking metformin as instructed. Working on diabetic appropriate diet. Denies side effects.     On diovan-hct for HTN. Taking as rx'd.    Due for Colon cancer screening    Comorbid obesity with fatty liver.     Increase symptoms of allergic rhinitis, allergy triggered asthma. Taking her antihistamine, nasal spray. Using albuterol more often.    The following portions of the patient's history were reviewed and updated as appropriate: allergies, current medications, past family history, past medical history, past social history, past surgical history, and problem list.    Review of Systems   Constitutional:  Positive for fatigue. Negative for fever.   HENT:  Positive for congestion (mild) and postnasal drip. Negative for mouth sores, nosebleeds, sore throat and trouble swallowing.    Eyes:  Negative for pain, discharge and redness.   Respiratory:  Positive for shortness of breath. Negative for cough and wheezing.    Cardiovascular:  Positive for leg swelling (mild). Negative for chest pain and palpitations.   Gastrointestinal:  Positive for abdominal pain (chronic, intermittent lower mid and LLQ), constipation (occasional) and diarrhea (occasional). Negative for blood in stool, nausea and vomiting.   Genitourinary:  Negative for dysuria, genital sores, hematuria, menstrual problem, pelvic pain, urgency and vaginal discharge.   Musculoskeletal:  Positive for arthralgias.   Skin:  Negative for rash.   Neurological:  Positive for headaches. Negative for dizziness, syncope and weakness.   Hematological:  Negative for adenopathy. Does not bruise/bleed easily.   Psychiatric/Behavioral:  Positive for sleep disturbance. Negative for confusion and dysphoric mood.    Pt's previous ROS reviewed and updated as indicated.       Objective   Vitals:     05/15/24 1528   BP: 140/88   Pulse: 80   SpO2: 99%     Body mass index is 32.23 kg/m².      05/15/24  1528   Weight: 79.9 kg (176 lb 3.2 oz)       Physical Exam  Vitals and nursing note reviewed.   Constitutional:       General: She is not in acute distress.     Appearance: She is well-groomed and overweight. She is not ill-appearing.   HENT:      Head: Atraumatic.      Mouth/Throat:      Mouth: Mucous membranes are moist. No oral lesions.      Pharynx: Oropharynx is clear.      Comments: Mallampati Class 3  Eyes:      General: No scleral icterus.     Conjunctiva/sclera: Conjunctivae normal.   Neck:      Thyroid: No thyroid mass.   Cardiovascular:      Rate and Rhythm: Normal rate and regular rhythm.      Pulses: Normal pulses.      Heart sounds: Normal heart sounds.   Pulmonary:      Effort: Pulmonary effort is normal.      Breath sounds: Normal breath sounds.   Musculoskeletal:      Right lower leg: Edema (mild) present.      Left lower leg: Edema (mild) present.   Lymphadenopathy:      Cervical: No cervical adenopathy.   Skin:     General: Skin is warm and dry.      Coloration: Skin is not jaundiced or pale.      Findings: No bruising or rash.   Neurological:      Mental Status: She is alert and oriented to person, place, and time.      Motor: No tremor.      Gait: Gait is intact.   Psychiatric:         Mood and Affect: Mood and affect normal.         Behavior: Behavior normal. Behavior is cooperative.         Cognition and Memory: Cognition normal.     Pt's previous physical exam reviewed and updated as indicated.      Lab Results   Component Value Date    HGBA1C 6.3 (H) 05/15/2024    HGBA1C 6.4 (H) 02/14/2024    HGBA1C 6.6 (H) 11/15/2023     Lab Results   Component Value Date    MICROALBUR 11.2 02/14/2024    CREATININE 0.61 05/15/2024     Lab Results   Component Value Date    ALT 31 11/15/2023         Assessment & Plan   Diagnoses and all orders for this visit:    1. Type 2 diabetes mellitus without  complication, without long-term current use of insulin (Primary)  -     Hemoglobin A1c  -     Basic Metabolic Panel    2. Immunity status testing  -     Hepatitis B Surface Antibody    3. Fatty liver  -     Hepatitis B Surface Antibody    4. Mild intermittent extrinsic asthma without complication  -     montelukast (Singulair) 10 MG tablet; Take 1 tablet by mouth Every Night.  Dispense: 30 tablet; Refill: 2    5. Essential hypertension    6. Screen for colon cancer  -     Cologuard - Stool, Per Rectum; Future         Recheck A1c. Adjust tx as indicated. Continue metformin, ARB.  Immunize for hep b if indicated.  Continue antihistamine, nasal CS and trial of singulair.  Continue diovan hct for HTN. Pt advised to eat a heart healthy diet and get regular aerobic exercise.  Routine f/u in 3 months, sooner as needed/instructed.  I will contact patient regarding test results and provide instructions regarding any necessary changes in plan of care.  Patient was encouraged to keep me informed of any acute changes, lack of improvement, or any new concerning symptoms.  Pt is aware of reasons to seek emergent care.  Patient voiced understanding of all instructions and denied further questions.    Please note that portions of this note may have been completed with a voice recognition program.

## 2024-05-16 LAB
BUN SERPL-MCNC: 10 MG/DL (ref 6–24)
BUN/CREAT SERPL: 16 (ref 9–23)
CALCIUM SERPL-MCNC: 9 MG/DL (ref 8.7–10.2)
CHLORIDE SERPL-SCNC: 100 MMOL/L (ref 96–106)
CO2 SERPL-SCNC: 23 MMOL/L (ref 20–29)
CREAT SERPL-MCNC: 0.61 MG/DL (ref 0.57–1)
EGFRCR SERPLBLD CKD-EPI 2021: 112 ML/MIN/1.73
GLUCOSE SERPL-MCNC: 63 MG/DL (ref 70–99)
HBA1C MFR BLD: 6.3 % (ref 4.8–5.6)
HBV SURFACE AB SER QL: NON REACTIVE
POTASSIUM SERPL-SCNC: 3.8 MMOL/L (ref 3.5–5.2)
SODIUM SERPL-SCNC: 137 MMOL/L (ref 134–144)

## 2024-05-20 PROBLEM — Z78.9 NONIMMUNE TO HEPATITIS B VIRUS: Status: ACTIVE | Noted: 2024-05-20

## 2024-06-18 DIAGNOSIS — I10 ESSENTIAL HYPERTENSION: ICD-10-CM

## 2024-06-18 RX ORDER — VALSARTAN AND HYDROCHLOROTHIAZIDE 80; 12.5 MG/1; MG/1
1 TABLET, FILM COATED ORAL DAILY
Qty: 90 TABLET | Refills: 0 | Status: SHIPPED | OUTPATIENT
Start: 2024-06-18

## 2024-08-03 DIAGNOSIS — E11.9 TYPE 2 DIABETES MELLITUS WITHOUT COMPLICATION, WITHOUT LONG-TERM CURRENT USE OF INSULIN: ICD-10-CM

## 2024-08-08 RX ORDER — METFORMIN HYDROCHLORIDE 500 MG/1
TABLET, EXTENDED RELEASE ORAL
Qty: 90 TABLET | Refills: 0 | Status: SHIPPED | OUTPATIENT
Start: 2024-08-08

## 2024-08-19 ENCOUNTER — OFFICE VISIT (OUTPATIENT)
Dept: FAMILY MEDICINE CLINIC | Facility: CLINIC | Age: 44
End: 2024-08-19
Payer: COMMERCIAL

## 2024-08-19 VITALS
SYSTOLIC BLOOD PRESSURE: 124 MMHG | BODY MASS INDEX: 32.2 KG/M2 | HEIGHT: 62 IN | DIASTOLIC BLOOD PRESSURE: 80 MMHG | WEIGHT: 175 LBS | HEART RATE: 82 BPM | OXYGEN SATURATION: 98 %

## 2024-08-19 DIAGNOSIS — Z30.41 ENCOUNTER FOR SURVEILLANCE OF CONTRACEPTIVE PILLS: ICD-10-CM

## 2024-08-19 DIAGNOSIS — I10 ESSENTIAL HYPERTENSION: ICD-10-CM

## 2024-08-19 DIAGNOSIS — J45.30 MILD PERSISTENT ASTHMA WITHOUT COMPLICATION: ICD-10-CM

## 2024-08-19 DIAGNOSIS — E11.9 TYPE 2 DIABETES MELLITUS WITHOUT COMPLICATION, WITHOUT LONG-TERM CURRENT USE OF INSULIN: Primary | ICD-10-CM

## 2024-08-19 DIAGNOSIS — Z78.9 NONIMMUNE TO HEPATITIS B VIRUS: ICD-10-CM

## 2024-08-19 LAB
EXPIRATION DATE: ABNORMAL
HBA1C MFR BLD: 5.9 % (ref 4.5–5.7)
Lab: ABNORMAL

## 2024-08-19 PROCEDURE — 83036 HEMOGLOBIN GLYCOSYLATED A1C: CPT | Performed by: FAMILY MEDICINE

## 2024-08-19 PROCEDURE — 99214 OFFICE O/P EST MOD 30 MIN: CPT | Performed by: FAMILY MEDICINE

## 2024-08-19 RX ORDER — FLUTICASONE PROPIONATE 110 UG/1
1 AEROSOL, METERED RESPIRATORY (INHALATION)
Qty: 12 G | Refills: 2 | Status: SHIPPED | OUTPATIENT
Start: 2024-08-19

## 2024-08-19 RX ORDER — ALBUTEROL SULFATE 90 UG/1
2 AEROSOL, METERED RESPIRATORY (INHALATION) EVERY 4 HOURS PRN
Qty: 8.5 G | Refills: 1 | Status: SHIPPED | OUTPATIENT
Start: 2024-08-19

## 2024-08-19 RX ORDER — NORGESTIMATE AND ETHINYL ESTRADIOL 7DAYSX3 28
1 KIT ORAL DAILY
Qty: 28 TABLET | Refills: 11 | Status: SHIPPED | OUTPATIENT
Start: 2024-08-19

## 2024-08-19 NOTE — PROGRESS NOTES
Subjective   Vivien Rosa is a 44 y.o. female.     History of Present Illness  Here for routine follow-up on several chronic medical problems.    Since last visit, started on metformin for NIDDM.  Taking as prescribed.  Denies side effects.  Struggling to follow diabetic appropriate diet, limit simple carbohydrates    On Diovan HCT for hypertension.  Using additional HCTZ as needed swelling.    Mild persistent asthma, recent increase in inhaler use due to dust/chemical exposure associated with her job as an .  Not currently on ICS.      The following portions of the patient's history were reviewed and updated as appropriate: allergies, current medications, past family history, past medical history, past social history, past surgical history, and problem list.    Review of Systems   Constitutional:  Positive for fatigue. Negative for fever.   HENT:  Positive for congestion (mild) and postnasal drip. Negative for mouth sores, nosebleeds, sore throat and trouble swallowing.    Eyes:  Negative for pain, discharge and redness.   Respiratory:  Positive for cough, shortness of breath and wheezing.    Cardiovascular:  Positive for leg swelling (mild). Negative for chest pain and palpitations.   Gastrointestinal:  Positive for abdominal pain (chronic, intermittent lower mid and LLQ), constipation (occasional) and diarrhea (occasional). Negative for blood in stool, nausea and vomiting.   Genitourinary:  Negative for dysuria, genital sores, hematuria, menstrual problem, pelvic pain, urgency and vaginal discharge.   Musculoskeletal:  Positive for arthralgias.   Skin:  Negative for rash.   Neurological:  Positive for headaches. Negative for dizziness, syncope and weakness.   Hematological:  Negative for adenopathy. Does not bruise/bleed easily.   Psychiatric/Behavioral:  Positive for sleep disturbance. Negative for confusion and dysphoric mood.    Pt's previous ROS reviewed and updated as indicated.        Objective   Vitals:    08/19/24 1553   BP: 124/80   Pulse: 82   SpO2: 98%     Body mass index is 32.08 kg/m².      08/19/24  1553   Weight: 79.4 kg (175 lb)       Physical Exam  Vitals and nursing note reviewed.   Constitutional:       General: She is not in acute distress.     Appearance: She is well-groomed and overweight. She is not ill-appearing.   HENT:      Head: Atraumatic.      Mouth/Throat:      Mouth: Mucous membranes are moist. No oral lesions.      Pharynx: Oropharynx is clear.      Comments: Mallampati Class 3  Eyes:      General: No scleral icterus.     Conjunctiva/sclera: Conjunctivae normal.   Neck:      Thyroid: No thyroid mass.   Cardiovascular:      Rate and Rhythm: Normal rate and regular rhythm.      Pulses: Normal pulses.      Heart sounds: Normal heart sounds.   Pulmonary:      Effort: Pulmonary effort is normal.      Breath sounds: Decreased breath sounds (mildly) present. No wheezing, rhonchi or rales.   Musculoskeletal:      Right lower leg: Edema (mild) present.      Left lower leg: Edema (mild) present.   Lymphadenopathy:      Cervical: No cervical adenopathy.   Skin:     General: Skin is warm and dry.      Coloration: Skin is not cyanotic, jaundiced or pale.      Findings: No bruising or rash.   Neurological:      Mental Status: She is alert and oriented to person, place, and time.      Motor: No tremor.      Gait: Gait is intact.   Psychiatric:         Mood and Affect: Mood and affect normal.         Behavior: Behavior normal. Behavior is cooperative.         Cognition and Memory: Cognition normal.     Pt's previous physical exam reviewed and updated as indicated.    Lab Results   Component Value Date    HGBA1C 5.9 (A) 08/19/2024    HGBA1C 6.3 (H) 05/15/2024    HGBA1C 6.4 (H) 02/14/2024     Lab Results   Component Value Date    MICROALBUR 11.2 02/14/2024    CREATININE 0.61 05/15/2024     Lab Results   Component Value Date    WBC 9.4 11/15/2023    HGB 12.4 11/15/2023    HCT 37.9  11/15/2023    MCV 88 11/15/2023     (H) 11/15/2023       Lab Results   Component Value Date    GLUCOSE 63 (L) 05/15/2024    BUN 10 05/15/2024    CREATININE 0.61 05/15/2024    EGFRIFNONA 121 06/09/2021    EGFRIFAFRI 147 06/09/2021    BCR 16 05/15/2024    K 3.8 05/15/2024    CO2 23 05/15/2024    CALCIUM 9.0 05/15/2024    PROTENTOTREF 7.1 11/15/2023    ALBUMIN 4.3 11/15/2023    LABIL2 1.5 11/15/2023    AST 33 11/15/2023    ALT 31 11/15/2023       Lab Results   Component Value Date    CHLPL 197 06/27/2023    TRIG 185 (H) 06/27/2023    HDL 57 06/27/2023     (H) 06/27/2023     Lab Results   Component Value Date    TSH 2.100 06/27/2023           Assessment & Plan   Diagnoses and all orders for this visit:    1. Type 2 diabetes mellitus without complication, without long-term current use of insulin (Primary)  -     POC Glycosylated Hemoglobin (Hb A1C)    2. Essential hypertension    3. Mild persistent asthma without complication  -     albuterol sulfate  (90 Base) MCG/ACT inhaler; Inhale 2 puffs Every 4 (Four) Hours As Needed for Wheezing or Shortness of Air.  Dispense: 8.5 g; Refill: 1  -     fluticasone (Flovent HFA) 110 MCG/ACT inhaler; Inhale 1 puff 2 (Two) Times a Day.  Dispense: 12 g; Refill: 2    4. Encounter for surveillance of contraceptive pills    5. Nonimmune to hepatitis B virus    Other orders  -     norgestimate-ethinyl estradiol (Tri-Sprintec) 0.18/0.215/0.25 MG-35 MCG per tablet; Take 1 tablet by mouth Daily.  Dispense: 28 tablet; Refill: 11       NIDDM well-controlled on metformin. Patient encouraged to take meds as rx'd, follow diabetic appropriate diet, exercise daily, perform feet check daily, and have yearly diabetic eye exams.    HTN controlled, continue Diovan HCT with additional HCTZ as needed swelling.    She is considering adding ASA and statin therapy. Pt advised to eat a heart healthy diet and get regular aerobic exercise.    Poorly controlled mild persistent asthma. I have  reviewed risks/benefits and potential side effects of various treatment options. Pt voices understanding and wishes to proceed with Flovent. Continue albuterol prn.    Routine refill of OCP provided.    She will check into cost/coverage of Hep B booster series with insurance and/or local pharmacy.    Routine follow-up in 3 months for annual physical, sooner as needed.  Patient was encouraged to keep me informed of any acute changes, lack of improvement, or any new concerning symptoms.  Pt is aware of reasons to seek emergent care.  Patient voiced understanding of all instructions and denied further questions.    Please note that portions of this note may have been completed with a voice recognition program.

## 2024-08-20 ENCOUNTER — TELEPHONE (OUTPATIENT)
Dept: FAMILY MEDICINE CLINIC | Facility: CLINIC | Age: 44
End: 2024-08-20
Payer: COMMERCIAL

## 2024-08-20 DIAGNOSIS — J45.20 MILD INTERMITTENT EXTRINSIC ASTHMA WITHOUT COMPLICATION: ICD-10-CM

## 2024-08-20 RX ORDER — MONTELUKAST SODIUM 10 MG/1
10 TABLET ORAL NIGHTLY
Qty: 30 TABLET | Refills: 2 | Status: SHIPPED | OUTPATIENT
Start: 2024-08-20

## 2024-08-20 NOTE — TELEPHONE ENCOUNTER
----- Message from [facility] sent at 8/20/2024  5:04 AM EDT -----  Regarding: Singular  Contact: [phone]  I forgot to ask, was the singular a one time prescription? It had no refills, will be out after next week.   Many thanks

## 2024-08-23 PROBLEM — J45.30 MILD PERSISTENT ASTHMA WITHOUT COMPLICATION: Status: ACTIVE | Noted: 2024-08-23

## 2024-09-23 DIAGNOSIS — I10 ESSENTIAL HYPERTENSION: ICD-10-CM

## 2024-09-24 RX ORDER — VALSARTAN AND HYDROCHLOROTHIAZIDE 80; 12.5 MG/1; MG/1
1 TABLET, FILM COATED ORAL DAILY
Qty: 90 TABLET | Refills: 0 | Status: SHIPPED | OUTPATIENT
Start: 2024-09-24

## 2024-11-01 DIAGNOSIS — I10 ESSENTIAL HYPERTENSION: ICD-10-CM

## 2024-11-01 RX ORDER — VALSARTAN AND HYDROCHLOROTHIAZIDE 80; 12.5 MG/1; MG/1
1 TABLET, FILM COATED ORAL DAILY
Qty: 90 TABLET | Refills: 0 | Status: SHIPPED | OUTPATIENT
Start: 2024-11-01

## 2024-11-03 DIAGNOSIS — E11.9 TYPE 2 DIABETES MELLITUS WITHOUT COMPLICATION, WITHOUT LONG-TERM CURRENT USE OF INSULIN: ICD-10-CM

## 2024-11-04 RX ORDER — METFORMIN HYDROCHLORIDE 500 MG/1
TABLET, EXTENDED RELEASE ORAL
Qty: 90 TABLET | Refills: 0 | Status: SHIPPED | OUTPATIENT
Start: 2024-11-04

## 2024-11-20 ENCOUNTER — OFFICE VISIT (OUTPATIENT)
Dept: FAMILY MEDICINE CLINIC | Facility: CLINIC | Age: 44
End: 2024-11-20
Payer: COMMERCIAL

## 2024-11-20 VITALS
OXYGEN SATURATION: 99 % | HEIGHT: 62 IN | WEIGHT: 176.4 LBS | DIASTOLIC BLOOD PRESSURE: 70 MMHG | SYSTOLIC BLOOD PRESSURE: 120 MMHG | BODY MASS INDEX: 32.46 KG/M2 | HEART RATE: 78 BPM

## 2024-11-20 DIAGNOSIS — Z23 NEED FOR INFLUENZA VACCINATION: ICD-10-CM

## 2024-11-20 DIAGNOSIS — I10 ESSENTIAL HYPERTENSION: ICD-10-CM

## 2024-11-20 DIAGNOSIS — E11.9 TYPE 2 DIABETES MELLITUS WITHOUT COMPLICATION, WITHOUT LONG-TERM CURRENT USE OF INSULIN: ICD-10-CM

## 2024-11-20 DIAGNOSIS — Z78.9 NONIMMUNE TO HEPATITIS B VIRUS: ICD-10-CM

## 2024-11-20 DIAGNOSIS — Z00.00 WELL ADULT EXAM: Primary | ICD-10-CM

## 2024-11-20 DIAGNOSIS — R53.82 CHRONIC FATIGUE: ICD-10-CM

## 2024-11-20 DIAGNOSIS — E66.811 CLASS 1 OBESITY DUE TO EXCESS CALORIES WITH SERIOUS COMORBIDITY AND BODY MASS INDEX (BMI) OF 32.0 TO 32.9 IN ADULT: ICD-10-CM

## 2024-11-20 DIAGNOSIS — J45.30 MILD PERSISTENT ASTHMA WITHOUT COMPLICATION: ICD-10-CM

## 2024-11-20 DIAGNOSIS — Z23 NEED FOR HEPATITIS B BOOSTER VACCINATION: ICD-10-CM

## 2024-11-20 DIAGNOSIS — E66.09 CLASS 1 OBESITY DUE TO EXCESS CALORIES WITH SERIOUS COMORBIDITY AND BODY MASS INDEX (BMI) OF 32.0 TO 32.9 IN ADULT: ICD-10-CM

## 2024-11-20 RX ORDER — ALBUTEROL SULFATE 90 UG/1
2 INHALANT RESPIRATORY (INHALATION) EVERY 4 HOURS PRN
Qty: 8.5 G | Refills: 1 | Status: SHIPPED | OUTPATIENT
Start: 2024-11-20

## 2024-11-20 NOTE — PROGRESS NOTES
Roman Rosa is a 44 y.o. female.     History of Present Illness  Here for annual exam.    Reproductive History  A0  Pregnancy complications (HTN, DM): none  Sexual Activity: active in monogamous heterosexual relationship   Contraception: s/p tubal, on OCP for regulation of menses, mood  Menses: regular  STD hx: no. Denies concern regarding exposure. Declines screening.  H/O abnormal pap: no  Cervical procedures: no     Social Hx  Household members: , 2 sons  Marital status:   Tobacco use: no  EtOH use: no  Illicit drug use: no  Employed full time as      Lifestyle  Diet: varied/balanced but excess caloric intake  Exercise: none regularly  Guns in home: no  Using seatbelt: yes  Sunscreen: yes  Mental Health: no concerns today  Feels safe in home: yes     Screenings  Last pap:   Last mammogram:   Colonoscopy: n/a  DEXA: n/a  FLP:   BG/A1c:   Vitamin D: unsure, previous deficiency  Last dental check up: UTD, seen twice yearly  Last vision exam: regular eye exams due to poor vision  Immunizations UTD: no, needs influenza vaccine and Hepatitis B boosters     Chronic conditions include:  NIDDM- currently on metformin, ARB  Obesity with fatty liver- struggling to limit caloric intake, exercise regularly  Mild intermittent asthma- using BEATRICE once weekly on average. Not currently using ICS, symptoms well controlled  HTN-currently on hctz, diovan. BP well controlled.    Snores loudly, chronic fatigue. Has home sleep study which was inconclusive. Did not feel she could afford the in lab test.    Pt's previous HPI reviewed and updated as indicated.     The following portions of the patient's history were reviewed and updated as appropriate: allergies, current medications, past family history, past medical history, past social history, past surgical history, and problem list.    Review of Systems   Constitutional:  Positive for fatigue. Negative for  fever.   HENT:  Positive for congestion (mild) and postnasal drip. Negative for mouth sores, nosebleeds, sore throat and trouble swallowing.    Eyes:  Negative for pain, discharge and redness.   Respiratory:  Positive for cough (dry, intermittent) and wheezing (mild, intermittent). Negative for shortness of breath.    Cardiovascular:  Negative for chest pain, palpitations and leg swelling.   Gastrointestinal:  Positive for constipation (occasional) and diarrhea (occasional). Negative for abdominal pain, blood in stool, nausea and vomiting.   Genitourinary:  Negative for dysuria, genital sores, hematuria, menstrual problem, pelvic pain, urgency and vaginal discharge.   Musculoskeletal:  Positive for arthralgias.   Skin:  Negative for rash.   Neurological:  Negative for dizziness, syncope, weakness and headaches.   Hematological:  Negative for adenopathy. Does not bruise/bleed easily.   Psychiatric/Behavioral:  Positive for sleep disturbance. Negative for confusion and dysphoric mood.    Pt's previous physical exam reviewed and updated as indicated.      Objective   Vitals:    11/20/24 1511   BP: 120/70   Pulse: 78   SpO2: 99%     Body mass index is 32.26 kg/m².      11/20/24  1511   Weight: 80 kg (176 lb 6.4 oz)       Physical Exam  Vitals and nursing note reviewed.   Constitutional:       General: She is not in acute distress.     Appearance: She is well-groomed and overweight. She is not ill-appearing.   HENT:      Head: Atraumatic.      Right Ear: Tympanic membrane, ear canal and external ear normal.      Left Ear: Tympanic membrane, ear canal and external ear normal.      Nose: Nose normal.      Mouth/Throat:      Mouth: Mucous membranes are moist. No oral lesions.      Pharynx: Oropharynx is clear.      Comments: Mallampati Class 3  Eyes:      General: No scleral icterus.     Conjunctiva/sclera: Conjunctivae normal.   Neck:      Thyroid: No thyroid mass.   Cardiovascular:      Rate and Rhythm: Normal rate and  regular rhythm.      Pulses: Normal pulses.      Heart sounds: Normal heart sounds.   Pulmonary:      Effort: Pulmonary effort is normal.      Breath sounds: Normal breath sounds.   Abdominal:      General: Bowel sounds are normal. There is no distension.      Palpations: Abdomen is soft. There is no mass.      Tenderness: There is no abdominal tenderness.   Musculoskeletal:      Right lower leg: Edema (mild) present.      Left lower leg: Edema (mild) present.   Lymphadenopathy:      Cervical: No cervical adenopathy.   Skin:     General: Skin is warm and dry.      Coloration: Skin is not jaundiced or pale.      Findings: No bruising or rash.   Neurological:      Mental Status: She is alert and oriented to person, place, and time.      Motor: No tremor.      Gait: Gait is intact.   Psychiatric:         Mood and Affect: Mood and affect normal.         Behavior: Behavior normal. Behavior is cooperative.         Cognition and Memory: Cognition normal.     Pt's previous physical exam reviewed and updated as indicated.    Lab Results   Component Value Date    HGBA1C 6.1 (A) 11/21/2024    HGBA1C 5.9 (A) 08/19/2024    HGBA1C 6.3 (H) 05/15/2024     Lab Results   Component Value Date    MICROALBUR 11.2 02/14/2024    CREATININE 0.68 11/20/2024         Assessment & Plan   Diagnoses and all orders for this visit:    1. Well adult exam (Primary)  -     Comprehensive Metabolic Panel  -     TSH Rfx On Abnormal To Free T4  -     CBC & Differential  -     Lipid Panel  -     Hepatitis B Vaccine Adult IM (ENGERIX/RECOMBIVAX)    2. Need for influenza vaccination  -     Fluzone >6mos    3. Type 2 diabetes mellitus without complication, without long-term current use of insulin  -     POC Glycosylated Hemoglobin (Hb A1C)    4. Mild persistent asthma without complication  -     albuterol sulfate  (90 Base) MCG/ACT inhaler; Inhale 2 puffs Every 4 (Four) Hours As Needed for Wheezing or Shortness of Air.  Dispense: 8.5 g; Refill:  1    5. Nonimmune to hepatitis B virus  -     Hepatitis B Vaccine Adult IM (ENGERIX/RECOMBIVAX)    6. Need for hepatitis B booster vaccination  -     Hepatitis B Vaccine Adult IM (ENGERIX/RECOMBIVAX)    7. Chronic fatigue  -     Vitamin B12  -     Iron       Age appropriate preventive care reviewed including cancer screenings, safety measures, mental health concerns, supplements, prevention of CV disease and DM, etc. Handout provided.    NIDDM controlled - continue metformin, ARB. Patient encouraged to take meds as rx'd, follow diabetic appropriate diet, exercise daily, perform feet check daily, and have yearly diabetic eye exams.    Asthma stable. Continue BEATRICE prn.     HTN controlled - continue diovan. Pt advised to eat a heart healthy diet and get regular aerobic exercise.    Assess status of vit/min deficiencies and replace as indicated.    Routine f/u in 3 months with pap (declined today). F/u sooner as needed/instructed.  I will contact patient regarding test results and provide instructions regarding any necessary changes in plan of care.  Patient was encouraged to keep me informed of any acute changes, lack of improvement, or any new concerning symptoms.  Pt is aware of reasons to seek emergent care.  Patient voiced understanding of all instructions and denied further questions.    Please note that portions of this note may have been completed with a voice recognition program.

## 2024-11-21 LAB
ALBUMIN SERPL-MCNC: 4.3 G/DL (ref 3.9–4.9)
ALP SERPL-CCNC: 90 IU/L (ref 44–121)
ALT SERPL-CCNC: 30 IU/L (ref 0–32)
AST SERPL-CCNC: 20 IU/L (ref 0–40)
BASOPHILS # BLD AUTO: 0.1 X10E3/UL (ref 0–0.2)
BASOPHILS NFR BLD AUTO: 1 %
BILIRUB SERPL-MCNC: 0.2 MG/DL (ref 0–1.2)
BUN SERPL-MCNC: 10 MG/DL (ref 6–24)
BUN/CREAT SERPL: 15 (ref 9–23)
CALCIUM SERPL-MCNC: 9.2 MG/DL (ref 8.7–10.2)
CHLORIDE SERPL-SCNC: 99 MMOL/L (ref 96–106)
CHOLEST SERPL-MCNC: 169 MG/DL (ref 100–199)
CO2 SERPL-SCNC: 23 MMOL/L (ref 20–29)
CREAT SERPL-MCNC: 0.68 MG/DL (ref 0.57–1)
EGFRCR SERPLBLD CKD-EPI 2021: 109 ML/MIN/1.73
EOSINOPHIL # BLD AUTO: 0.2 X10E3/UL (ref 0–0.4)
EOSINOPHIL NFR BLD AUTO: 2 %
ERYTHROCYTE [DISTWIDTH] IN BLOOD BY AUTOMATED COUNT: 12.9 % (ref 11.7–15.4)
EXPIRATION DATE: ABNORMAL
GLOBULIN SER CALC-MCNC: 2.8 G/DL (ref 1.5–4.5)
GLUCOSE SERPL-MCNC: 62 MG/DL (ref 70–99)
HBA1C MFR BLD: 6.1 % (ref 4.5–5.7)
HCT VFR BLD AUTO: 37.2 % (ref 34–46.6)
HDLC SERPL-MCNC: 55 MG/DL
HGB BLD-MCNC: 11.7 G/DL (ref 11.1–15.9)
IMM GRANULOCYTES # BLD AUTO: 0 X10E3/UL (ref 0–0.1)
IMM GRANULOCYTES NFR BLD AUTO: 0 %
IRON SERPL-MCNC: 80 UG/DL (ref 27–159)
LDLC SERPL CALC-MCNC: 82 MG/DL (ref 0–99)
LYMPHOCYTES # BLD AUTO: 3.6 X10E3/UL (ref 0.7–3.1)
LYMPHOCYTES NFR BLD AUTO: 35 %
Lab: ABNORMAL
MCH RBC QN AUTO: 27.4 PG (ref 26.6–33)
MCHC RBC AUTO-ENTMCNC: 31.5 G/DL (ref 31.5–35.7)
MCV RBC AUTO: 87 FL (ref 79–97)
MONOCYTES # BLD AUTO: 0.9 X10E3/UL (ref 0.1–0.9)
MONOCYTES NFR BLD AUTO: 8 %
NEUTROPHILS # BLD AUTO: 5.4 X10E3/UL (ref 1.4–7)
NEUTROPHILS NFR BLD AUTO: 54 %
PLATELET # BLD AUTO: 514 X10E3/UL (ref 150–450)
POTASSIUM SERPL-SCNC: 3.9 MMOL/L (ref 3.5–5.2)
PROT SERPL-MCNC: 7.1 G/DL (ref 6–8.5)
RBC # BLD AUTO: 4.27 X10E6/UL (ref 3.77–5.28)
SODIUM SERPL-SCNC: 136 MMOL/L (ref 134–144)
TRIGL SERPL-MCNC: 194 MG/DL (ref 0–149)
TSH SERPL DL<=0.005 MIU/L-ACNC: 2.74 UIU/ML (ref 0.45–4.5)
VIT B12 SERPL-MCNC: 252 PG/ML (ref 232–1245)
VLDLC SERPL CALC-MCNC: 32 MG/DL (ref 5–40)
WBC # BLD AUTO: 10.1 X10E3/UL (ref 3.4–10.8)

## 2025-01-17 DIAGNOSIS — E11.9 TYPE 2 DIABETES MELLITUS WITHOUT COMPLICATION, WITHOUT LONG-TERM CURRENT USE OF INSULIN: ICD-10-CM

## 2025-01-20 RX ORDER — METFORMIN HYDROCHLORIDE 500 MG/1
TABLET, EXTENDED RELEASE ORAL
Qty: 90 TABLET | Refills: 0 | Status: SHIPPED | OUTPATIENT
Start: 2025-01-20

## 2025-01-30 DIAGNOSIS — I10 ESSENTIAL HYPERTENSION: ICD-10-CM

## 2025-01-30 RX ORDER — VALSARTAN AND HYDROCHLOROTHIAZIDE 80; 12.5 MG/1; MG/1
1 TABLET, FILM COATED ORAL DAILY
Qty: 90 TABLET | Refills: 0 | Status: SHIPPED | OUTPATIENT
Start: 2025-01-30

## 2025-02-06 DIAGNOSIS — E11.9 TYPE 2 DIABETES MELLITUS WITHOUT COMPLICATION, WITHOUT LONG-TERM CURRENT USE OF INSULIN: ICD-10-CM

## 2025-02-06 RX ORDER — METFORMIN HYDROCHLORIDE 500 MG/1
TABLET, EXTENDED RELEASE ORAL
Qty: 90 TABLET | Refills: 0 | OUTPATIENT
Start: 2025-02-06

## 2025-03-05 ENCOUNTER — OFFICE VISIT (OUTPATIENT)
Dept: FAMILY MEDICINE CLINIC | Facility: CLINIC | Age: 45
End: 2025-03-05
Payer: COMMERCIAL

## 2025-03-05 VITALS
BODY MASS INDEX: 32.79 KG/M2 | HEIGHT: 62 IN | DIASTOLIC BLOOD PRESSURE: 92 MMHG | OXYGEN SATURATION: 98 % | HEART RATE: 74 BPM | SYSTOLIC BLOOD PRESSURE: 140 MMHG | WEIGHT: 178.2 LBS

## 2025-03-05 DIAGNOSIS — I10 ESSENTIAL HYPERTENSION: ICD-10-CM

## 2025-03-05 DIAGNOSIS — Z23 NEED FOR HEPATITIS B BOOSTER VACCINATION: ICD-10-CM

## 2025-03-05 DIAGNOSIS — Z78.9 NONIMMUNE TO HEPATITIS B VIRUS: ICD-10-CM

## 2025-03-05 DIAGNOSIS — E11.9 TYPE 2 DIABETES MELLITUS WITHOUT COMPLICATION, WITHOUT LONG-TERM CURRENT USE OF INSULIN: Primary | ICD-10-CM

## 2025-03-05 DIAGNOSIS — J45.30 MILD PERSISTENT ASTHMA WITHOUT COMPLICATION: ICD-10-CM

## 2025-03-05 DIAGNOSIS — Z53.20 STATIN DECLINED: ICD-10-CM

## 2025-03-05 DIAGNOSIS — K76.0 FATTY LIVER: ICD-10-CM

## 2025-03-05 DIAGNOSIS — E66.811 CLASS 1 OBESITY DUE TO EXCESS CALORIES WITH SERIOUS COMORBIDITY AND BODY MASS INDEX (BMI) OF 32.0 TO 32.9 IN ADULT: ICD-10-CM

## 2025-03-05 DIAGNOSIS — E66.09 CLASS 1 OBESITY DUE TO EXCESS CALORIES WITH SERIOUS COMORBIDITY AND BODY MASS INDEX (BMI) OF 32.0 TO 32.9 IN ADULT: ICD-10-CM

## 2025-03-05 LAB
EXPIRATION DATE: ABNORMAL
HBA1C MFR BLD: 6 % (ref 4.5–5.7)
Lab: ABNORMAL

## 2025-03-05 RX ORDER — TIRZEPATIDE 2.5 MG/.5ML
2.5 INJECTION, SOLUTION SUBCUTANEOUS WEEKLY
Qty: 2 ML | Refills: 0 | Status: SHIPPED | OUTPATIENT
Start: 2025-03-05

## 2025-03-05 NOTE — PROGRESS NOTES
Subjective   Vivien Rosa is a 44 y.o. female.     History of Present Illness  Here for routine f/u on several chronic med problems which include:  NIDDM- currently on metformin, ARB  Obesity with fatty liver- struggling to limit caloric intake, does not exercise regularly  Mild intermittent asthma- using BEATRICE once weekly or less on average. Not currently using ICS, symptoms well controlled  HTN- currently on hctz, diovan. BP generally well controlled but noted to be elevated today     Snores loudly, chronic fatigue. Has home sleep study which was inconclusive. Did not feel she could afford the in lab test.    Not currently on B12 replacement, previously low.    Pt's previous HPI reviewed and updated as indicated.     The following portions of the patient's history were reviewed and updated as appropriate: allergies, current medications, past family history, past medical history, past social history, past surgical history, and problem list.    Review of Systems   Constitutional:  Positive for fatigue. Negative for fever.   HENT:  Positive for congestion (mild) and postnasal drip. Negative for mouth sores, nosebleeds, sore throat and trouble swallowing.    Eyes:  Negative for pain, discharge and redness.   Respiratory:  Positive for cough (dry, intermittent) and wheezing (mild, intermittent). Negative for shortness of breath.    Cardiovascular:  Negative for chest pain, palpitations and leg swelling.   Gastrointestinal:  Positive for constipation (occasional). Negative for abdominal pain, blood in stool, diarrhea, nausea and vomiting.   Genitourinary:  Negative for dysuria, hematuria and urgency.   Musculoskeletal:  Positive for arthralgias.   Skin:  Negative for rash.   Neurological:  Negative for dizziness, syncope, weakness and headaches.   Hematological:  Negative for adenopathy. Does not bruise/bleed easily.   Psychiatric/Behavioral:  Positive for sleep disturbance. Negative for confusion and dysphoric  mood.    Pt's previous ROS reviewed and updated as indicated.       Objective   Vitals:    03/05/25 1502   BP: 140/92   Pulse: 74   SpO2: 98%     Body mass index is 32.59 kg/m².      03/05/25  1502   Weight: 80.8 kg (178 lb 3.2 oz)         Physical Exam  Vitals and nursing note reviewed.   Constitutional:       General: She is not in acute distress.     Appearance: She is well-groomed and overweight. She is not ill-appearing.   HENT:      Head: Atraumatic.      Mouth/Throat:      Mouth: Mucous membranes are moist.      Comments: Mallampati Class 3  Eyes:      General: No scleral icterus.     Conjunctiva/sclera: Conjunctivae normal.   Neck:      Thyroid: No thyroid mass.   Cardiovascular:      Rate and Rhythm: Normal rate and regular rhythm.      Pulses: Normal pulses.      Heart sounds: Normal heart sounds.   Pulmonary:      Effort: Pulmonary effort is normal.      Breath sounds: Normal breath sounds. No wheezing, rhonchi or rales.   Musculoskeletal:      Right lower leg: Edema (mild) present.      Left lower leg: Edema (mild) present.   Lymphadenopathy:      Cervical: No cervical adenopathy.   Skin:     General: Skin is warm and dry.      Coloration: Skin is not jaundiced or pale.      Findings: No bruising or rash.   Neurological:      Mental Status: She is alert and oriented to person, place, and time.      Motor: No tremor.      Gait: Gait is intact.   Psychiatric:         Mood and Affect: Mood and affect normal.         Behavior: Behavior normal. Behavior is cooperative.         Cognition and Memory: Cognition normal.     Pt's previous physical exam reviewed and updated as indicated.    Lab Results   Component Value Date    HGBA1C 6.0 (A) 03/05/2025    HGBA1C 6.1 (A) 11/21/2024    HGBA1C 5.9 (A) 08/19/2024     Lab Results   Component Value Date    MICROALBUR 10.7 03/05/2025    CREATININE 0.68 11/20/2024     Lab Results   Component Value Date    WBC 10.1 11/20/2024    HGB 11.7 11/20/2024    HCT 37.2 11/20/2024     MCV 87 11/20/2024     (H) 11/20/2024       Lab Results   Component Value Date    GLUCOSE 62 (L) 11/20/2024    BUN 10 11/20/2024    CREATININE 0.68 11/20/2024    EGFRIFNONA 121 06/09/2021    EGFRIFAFRI 147 06/09/2021    BCR 15 11/20/2024    K 3.9 11/20/2024    CO2 23 11/20/2024    CALCIUM 9.2 11/20/2024    ALBUMIN 4.3 11/20/2024    AST 20 11/20/2024    ALT 30 11/20/2024       Lab Results   Component Value Date    CHLPL 169 11/20/2024    TRIG 194 (H) 11/20/2024    HDL 55 11/20/2024    LDL 82 11/20/2024     Lab Results   Component Value Date    TSH 2.740 11/20/2024           Assessment & Plan   Diagnoses and all orders for this visit:    1. Type 2 diabetes mellitus without complication, without long-term current use of insulin (Primary)  -     POC Glycosylated Hemoglobin (Hb A1C)  -     Microalbumin / Creatinine Urine Ratio - Urine, Clean Catch  -     Mounjaro 2.5 MG/0.5ML solution auto-injector; Inject 2.5 mg under the skin into the appropriate area as directed 1 (One) Time Per Week. CONTACT MD FOR REFILL DOSE  Dispense: 2 mL; Refill: 0    2. Essential hypertension    3. Class 1 obesity due to excess calories with serious comorbidity and body mass index (BMI) of 32.0 to 32.9 in adult    4. Fatty liver  -     Mounjaro 2.5 MG/0.5ML solution auto-injector; Inject 2.5 mg under the skin into the appropriate area as directed 1 (One) Time Per Week. CONTACT MD FOR REFILL DOSE  Dispense: 2 mL; Refill: 0    5. Mild persistent asthma without complication    6. Nonimmune to hepatitis B virus  -     Hepatitis B Vaccine Adult IM (ENGERIX/RECOMBIVAX)    7. Need for hepatitis B booster vaccination  -     Hepatitis B Vaccine Adult IM (ENGERIX/RECOMBIVAX)    8. Statin declined       NIDDM in setting of chronic obesity and fatty liver. I have reviewed risks/benefits and potential side effects of various treatment options. Pt voices understanding and wishes to proceed with Mounjaro. Continue ARB. Patient encouraged to take meds  as rx'd, follow diabetic appropriate diet, exercise daily, perform feet check daily, and have yearly diabetic eye exams.    HTN- continue diovan HCT. Monitor closely at home. Report persistent elevations. Pt advised to eat a heart healthy diet and get regular aerobic exercise.    Asthma stable - continue BEATRICE prn    She is aware she is overdue for cervical cancer screening.    Routine f/u in 3 months, sooner as needed.  Patient was encouraged to keep me informed of any acute changes, lack of improvement, or any new concerning symptoms.  Pt is aware of reasons to seek emergent care.  Patient voiced understanding of all instructions and denied further questions.    Please note that portions of this note may have been completed with a voice recognition program.

## 2025-03-06 LAB
ALBUMIN/CREAT UR: 8 MG/G CREAT (ref 0–29)
CREAT UR-MCNC: 130 MG/DL
MICROALBUMIN UR-MCNC: 10.7 UG/ML

## 2025-03-28 ENCOUNTER — E-VISIT (OUTPATIENT)
Dept: FAMILY MEDICINE CLINIC | Facility: TELEHEALTH | Age: 45
End: 2025-03-28
Payer: COMMERCIAL

## 2025-03-28 PROCEDURE — FABRICHEALTHVISIT: Performed by: NURSE PRACTITIONER

## 2025-03-28 RX ORDER — BENZONATATE 100 MG/1
100 CAPSULE ORAL 3 TIMES DAILY PRN
Start: 2025-03-28 | End: 2025-04-02

## 2025-03-28 RX ORDER — FLUTICASONE PROPIONATE 50 MCG
2 SPRAY, SUSPENSION (ML) NASAL DAILY
Start: 2025-03-28 | End: 2025-04-02

## 2025-03-28 NOTE — E-VISIT TREATED
Date: 2025 04:03:11  Clinician: Kimberley Bernard  Clinician NPI: [id number]  Patient: Vivien Rosa  Patient : 1980  Patient Address: [address] [place] [address]  Patient Phone: [phone]  Visit Protocol: URI  Patient Summary:  Vivien is a 44 year old ( : 1980 ) female who initiated a visit for cold, sinus infection, or influenza.     Vivien states the symptoms started gradually 3-5 days ago.   Symptom start date: 25   The symptoms consist   of tooth pain, nasal congestion, malaise, a headache, a cough, facial pain or pressure, and rhinitis. Vivien is experiencing difficulty breathing due to nasal congestion but is not short of breath.   Symptom details     Nasal secretions: The color of the mucus is green.    Cough: Vivien coughs a few times an hour and the cough is not more bothersome at night. Phlegm comes into the throat when coughing. Vivien believes the cough is caused by post-nasal drip. The color of the phlegm is green.     Facial pain or pressure: The facial pain or pressure feels worse when bending over or leaning forward.     Headache: The headache is moderate (4-6 on a 10 point pain scale).     Tooth pain: The tooth pain is not caused by a cavity, recent dental work, or other mouth problems.      Vivien denies having chills, diarrhea, nausea, myalgias, fever, sore throat, anosmia and ageusia, wheezing, ear pain, and vomiting. Vivien also denies double sickening (worsening symptoms after initial improvement), having recent facial or sinus   surgery in the past 60 days, and taking antibiotic medication in the past month.   Precipitating events  Vivien has not recently been exposed to someone with influenza. Vivien has not been in close contact with any high risk individuals.    Vivien   has received the influenza vaccine within the last 2 weeks.   Pertinent COVID-19 (Coronavirus) information  Since the symptoms started, Vivien has not  tested for COVID-19. Vivien was not able to re-test today.   Vivien has not received a COVID-19   vaccine in the past year.   Pertinent medical history   Vivien has diabetes. A provider has told Vivien that their diabetes is in control.   Vivien has asthma. Vivien uses quick-relief inhaler less than two times per week and wakes up at night   with asthma symptoms less than two times per month. Vivien refills the quick-relief inhaler less than two times per year.   Vivien had 2 sinus infections within the past year.   A provider has not told Vivien to avoid NSAIDs.   Vivien does not   get yeast infections when an antibiotic is taken.   Vivien denies having immunosuppressive conditions (e.g., chemotherapy, HIV, organ transplant, long-term use of steroids or other immunosuppressive medications, splenectomy). Vivien denies having   severe COPD, heart disease, and congestive heart failure.   Vivien denies having chronic lung disease, cystic fibrosis, hypertension, long-term disabilities, mental health conditions, sickle cell disease or thalassemia, stroke or other cardiovascular   disease, substance use disorders, or tuberculosis (TB).  Vivien does not smoke or use smokeless tobacco. Vivien does not vape or use other e-cigarette products.   Vivien denies pregnancy and denies breastfeeding.   Additional information as   reported by the patient (free text): Tried rhe Theresa pot, mucinex, allergy pills, sinuses are compacted now, mucus is green, cough starting   Weight: 170 lbs (77.11 kg)    MEDICATIONS: hydrochlorothiazide oral, flu vacc quadrivalent 2024 south (6mos up)(PF) intramuscular, metronidazole oral, ciprofloxacin HCl oral, Mounjaro subcutaneous, albuterol sulfate inhalation, norgestimate-ethinyl estradiol oral, methylprednisolone   acetate injection, ALLERGIES: NKDA  Clinician Response:  Dear Vivien,  Based on the information provided, you have a viral upper respiratory infection,  otherwise known as a cold. Symptoms vary from person to person, but can include sneezing, coughing, a runny nose, sore throat, and   headache and range from mild to severe.  Unfortunately, there are no medications that can cure a cold, so treatment is focused on controlling symptoms as much as possible. Most people gradually feel better until symptoms are gone in 1-2 weeks.    Medication information  Because you have a viral infection, antibiotics will not help you get better. Treating a viral infection with antibiotics could actually make you feel worse.  I am prescribing:       Fluticasone 50 mcg/actuation nasal spray. Spray twice in each nostril 1 time per day; after 1 week, may adjust to 1 - 2 sprays in each nostril 1 time per day. This medication takes several days to start working, so keep taking it even if it doesn't help   right away. There are no refills with this prescription.      Benzonatate (Tessalon Perles) 100 mg oral capsule. Take 1-2 capsules by mouth 3 times per day as needed for your cough. There are no refills with this prescription.     Unless you are allergic to the over-the-counter medication(s) below, I recommend using:       Acetaminophen (Tylenol or store brand) oral tablet. Take 1-2 tablets by mouth every 4-6 hours to help with the discomfort.      Ibuprofen (Advil or store brand) 200 mg oral tablet. Take 1-3 tablets (200-600 mg) by mouth every 8 hours to help with the discomfort. Make sure to take the ibuprofen with food. Do not exceed 2400 mg in 24 hours.     Over-the-counter medications do not require a prescription. Ask the pharmacist if you have any questions.  Tips for using a nasal spray:     When the medication is being sprayed in your nose, point the tip of the nasal spray towards your ear.    Do not blow your nose after using the spray.    Do not lean your head back after using the spray as it will go down your throat.    Wipe the tip of the nose piece after use with a dry, clean  tissue.     Self care  Steps you can take to be as comfortable as possible:     Rest.    Drink plenty of fluids.    Take a warm shower to loosen congestion.    Use a cool-mist humidifier.    Take a spoonful of honey to reduce your cough.     When to seek care  Please be seen in a clinic or urgent care if any of the following occur:   New symptoms develop, or symptoms become worse   Call 911 or go to the emergency room if any of the following occur:     If you feel that your throat is closing off    Suddenly develop a rash    Unable to swallow fluids or are drooling     For the latest updates on COVID-19 (Coronavirus), please visit the Centers for Disease Control and Prevention (CDC). Also, your state and local health department websites may provide additional guidance regarding testing and isolation recommendations   for your location.   Diagnosis: Viral URI  Diagnosis ICD: J06.9    Follow up instructions:  ATTENTION: If you have been prescribed medications, your prescriptions will not be sent until you choose your pharmacy. To do so open the link within your notification, or go to [employer] and click eVisit in the menu to open your   treatment plan. From there, you can select your pharmacy at the bottom of your after visit summary. You can also go to [email or web address]   Additional Clinician Notes:  rest  fluids  PCP if symptoms persist   ER for any worsening symptoms such as high fever, chest pain or SOA    Prescriptions  Prescription: fluticasone 50 mcg/actuation nasal spray,suspension, spray twice in each nostril 1 time per day; after 1 week, may adjust to 1 - 2 sprays in each nostril 1 time per day.  Sent To: [facility] #[id number] - [id number] - [address],  [place] [address]  Prescription: benzonatate (Tessalon Perles) 100 mg oral capsule, take 1-2 capsules by mouth 3 times per day as needed  Sent To: [facility] #[id number] - [id number] - [address],   [place] [address]

## 2025-03-31 DIAGNOSIS — K76.0 FATTY LIVER: ICD-10-CM

## 2025-03-31 DIAGNOSIS — E11.9 TYPE 2 DIABETES MELLITUS WITHOUT COMPLICATION, WITHOUT LONG-TERM CURRENT USE OF INSULIN: ICD-10-CM

## 2025-04-01 RX ORDER — TIRZEPATIDE 2.5 MG/.5ML
INJECTION, SOLUTION SUBCUTANEOUS
Qty: 2 ML | Refills: 0 | Status: SHIPPED | OUTPATIENT
Start: 2025-04-01

## 2025-04-29 DIAGNOSIS — K76.0 FATTY LIVER: ICD-10-CM

## 2025-04-29 DIAGNOSIS — E11.9 TYPE 2 DIABETES MELLITUS WITHOUT COMPLICATION, WITHOUT LONG-TERM CURRENT USE OF INSULIN: ICD-10-CM

## 2025-04-29 NOTE — TELEPHONE ENCOUNTER
Rx Refill Note  Requested Prescriptions     Pending Prescriptions Disp Refills    Mounjaro 2.5 MG/0.5ML solution auto-injector [Pharmacy Med Name: MOUNJARO 2.5MG/0.5ML INJ ( 4 PENS)] 2 mL 0     Sig: ADMINISTER 2.5 MG UNDER THE SKIN 1 TIME EVERY WEEK AS DIRECTED. CONTACT MEDICAL DOCTOR FOR REFILL DOSE      Last office visit with prescribing clinician: 3/5/2025   Last telemedicine visit with prescribing clinician: Visit date not found   Next office visit with prescribing clinician: 6/16/2025                         Would you like a call back once the refill request has been completed: [] Yes [] No    If the office needs to give you a call back, can they leave a voicemail: [] Yes [] No    Aria Agosto MA  04/29/25, 16:18 EDT

## 2025-05-01 RX ORDER — TIRZEPATIDE 5 MG/.5ML
5 INJECTION, SOLUTION SUBCUTANEOUS WEEKLY
Qty: 2 ML | Refills: 2 | Status: SHIPPED | OUTPATIENT
Start: 2025-05-01

## 2025-06-16 ENCOUNTER — OFFICE VISIT (OUTPATIENT)
Dept: FAMILY MEDICINE CLINIC | Facility: CLINIC | Age: 45
End: 2025-06-16
Payer: COMMERCIAL

## 2025-06-16 VITALS
HEART RATE: 80 BPM | BODY MASS INDEX: 29.63 KG/M2 | OXYGEN SATURATION: 99 % | DIASTOLIC BLOOD PRESSURE: 76 MMHG | SYSTOLIC BLOOD PRESSURE: 118 MMHG | WEIGHT: 161 LBS | HEIGHT: 62 IN

## 2025-06-16 DIAGNOSIS — I10 ESSENTIAL HYPERTENSION: ICD-10-CM

## 2025-06-16 DIAGNOSIS — Z78.9 NONIMMUNE TO HEPATITIS B VIRUS: ICD-10-CM

## 2025-06-16 DIAGNOSIS — E11.9 TYPE 2 DIABETES MELLITUS WITHOUT COMPLICATION, WITHOUT LONG-TERM CURRENT USE OF INSULIN: Primary | ICD-10-CM

## 2025-06-16 DIAGNOSIS — K76.0 FATTY LIVER: ICD-10-CM

## 2025-06-16 DIAGNOSIS — Z86.39 HISTORY OF OBESITY: ICD-10-CM

## 2025-06-16 DIAGNOSIS — Z23 NEED FOR HEPATITIS B BOOSTER VACCINATION: ICD-10-CM

## 2025-06-16 LAB
EXPIRATION DATE: NORMAL
HBA1C MFR BLD: 5.6 % (ref 4.5–5.7)
Lab: NORMAL

## 2025-06-16 RX ORDER — TIRZEPATIDE 7.5 MG/.5ML
7.5 INJECTION, SOLUTION SUBCUTANEOUS WEEKLY
Qty: 2 ML | Refills: 2 | Status: SHIPPED | OUTPATIENT
Start: 2025-06-16

## 2025-06-16 NOTE — PROGRESS NOTES
Roman Rosa is a 45 y.o. female.     History of Present Illness  Here for f/u on DM, HTN, obesity.  Diabetes  Visit type:  Follow-up  Diabetes type:  Type 2  MedicAlert ID: no    Disease duration:  18 months  Associated symptoms:     fatigue and weight loss      no blurred vision, no chest pain, no foot paresthesias, no foot ulcerations, no polydipsia, no polyuria and no weakness    Symptom course:  Stable  Hypoglycemia symptoms:     no confusion, no dizziness, no headaches, no speech difficulty, no sweats and no tremors    Hypoglycemia complications:     no blackouts, no hospitalization, no nocturnal hypoglycemia, no required assistance and no required glucagon injection    Treatment compliance:  All of the time  Current diet:  Generally healthy  Meal planning:  Avoidance of concentrated sweets  Exercise:  Rarely  Eye exam current: yes    Sees podiatrist: no      Currently on Mounjaro.  Taking as prescribed.  Mild intermittent nausea, otherwise no side effects reported.    Max weight previously 184.  Today's weight 161.  BMI now below 30.    On Diovan HCT for hypertension.  Blood pressure well-controlled.    The following portions of the patient's history were reviewed and updated as appropriate: allergies, current medications, past family history, past medical history, past social history, past surgical history, and problem list.    Review of Systems   Constitutional:  Positive for fatigue and weight loss. Negative for fever.   HENT:  Positive for congestion (mild) and postnasal drip. Negative for mouth sores, nosebleeds, sore throat and trouble swallowing.    Eyes:  Negative for blurred vision, pain, discharge and redness.   Respiratory:  Positive for cough (dry, intermittent) and wheezing (mild, intermittent). Negative for shortness of breath.    Cardiovascular:  Negative for chest pain, palpitations and leg swelling.   Gastrointestinal:  Positive for constipation (occasional). Negative for  abdominal pain, blood in stool, diarrhea and vomiting.   Endocrine: Negative for polydipsia and polyuria.   Genitourinary:  Negative for dysuria, hematuria and urgency.   Musculoskeletal:  Positive for arthralgias.   Skin:  Negative for rash.   Neurological:  Negative for dizziness, tremors, syncope, speech difficulty, weakness and headaches.   Hematological:  Negative for adenopathy. Does not bruise/bleed easily.   Psychiatric/Behavioral:  Positive for sleep disturbance. Negative for confusion and dysphoric mood.    Pt's previous ROS reviewed and updated as indicated.       Objective   Vitals:    06/16/25 0855   BP: 118/76   Pulse: 80   SpO2: 99%     Body mass index is 29.45 kg/m².      06/16/25  0855   Weight: 73 kg (161 lb)       Physical Exam  Vitals and nursing note reviewed.   Constitutional:       General: She is not in acute distress.     Appearance: She is well-groomed and overweight. She is not ill-appearing.   HENT:      Head: Atraumatic.      Mouth/Throat:      Mouth: Mucous membranes are moist.      Comments: Mallampati Class 3  Eyes:      General: No scleral icterus.     Conjunctiva/sclera: Conjunctivae normal.   Neck:      Thyroid: No thyroid mass.   Cardiovascular:      Rate and Rhythm: Normal rate and regular rhythm.      Pulses: Normal pulses.      Heart sounds: Normal heart sounds.   Pulmonary:      Effort: Pulmonary effort is normal.      Breath sounds: Normal breath sounds.   Abdominal:      General: There is no distension.      Palpations: Abdomen is soft.      Tenderness: There is no abdominal tenderness.   Musculoskeletal:      Right lower leg: Edema (mild) present.      Left lower leg: Edema (mild) present.   Lymphadenopathy:      Cervical: No cervical adenopathy.   Skin:     General: Skin is warm and dry.      Coloration: Skin is not jaundiced or pale.      Findings: No bruising or rash.   Neurological:      Mental Status: She is alert and oriented to person, place, and time.      Gait:  Gait is intact.   Psychiatric:         Behavior: Behavior normal. Behavior is cooperative.         Cognition and Memory: Cognition normal.     Pt's previous physical exam reviewed and updated as indicated.      Lab Results   Component Value Date    HGBA1C 5.6 06/16/2025    HGBA1C 6.0 (A) 03/05/2025    HGBA1C 6.1 (A) 11/21/2024     Lab Results   Component Value Date    MICROALBUR 10.7 03/05/2025    CREATININE 0.68 11/20/2024     Lab Results   Component Value Date    WBC 10.1 11/20/2024    HGB 11.7 11/20/2024    HCT 37.2 11/20/2024    MCV 87 11/20/2024     (H) 11/20/2024       Lab Results   Component Value Date    GLUCOSE 62 (L) 11/20/2024    BUN 10 11/20/2024    CREATININE 0.68 11/20/2024    EGFRIFNONA 121 06/09/2021    EGFRIFAFRI 147 06/09/2021    BCR 15 11/20/2024    K 3.9 11/20/2024    CO2 23 11/20/2024    CALCIUM 9.2 11/20/2024    ALBUMIN 4.3 11/20/2024    AST 20 11/20/2024    ALT 30 11/20/2024       Lab Results   Component Value Date    CHLPL 169 11/20/2024    TRIG 194 (H) 11/20/2024    HDL 55 11/20/2024    LDL 82 11/20/2024     Lab Results   Component Value Date    TSH 2.740 11/20/2024           Assessment & Plan   Diagnoses and all orders for this visit:    1. Type 2 diabetes mellitus without complication, without long-term current use of insulin (Primary)  -     POC Glycosylated Hemoglobin (Hb A1C)  -     Tirzepatide (Mounjaro) 7.5 MG/0.5ML solution auto-injector; Inject 7.5 mg under the skin into the appropriate area as directed 1 (One) Time Per Week.  Dispense: 2 mL; Refill: 2    2. Essential hypertension    3. Fatty liver    4. Nonimmune to hepatitis B virus    5. Need for hepatitis B booster vaccination    6. History of obesity       NIDDM well-controlled.  Previous comorbid obesity with BMI now below 30.  Continue Mounjaro, ARB. Patient encouraged to take meds as rx'd, follow diabetic appropriate diet, exercise daily, perform feet check daily, and have yearly diabetic eye exams.    Hypertension  controlled, continue Diovan HCT. Pt advised to eat a heart healthy diet and get regular aerobic exercise.    Last hepatitis B booster vaccination.    Reminded she is overdue for cervical cancer screening.    Follow-up in 3 months, sooner as needed.  Patient was encouraged to keep me informed of any acute changes, lack of improvement, or any new concerning symptoms.  Pt is aware of reasons to seek emergent care.  Patient voiced understanding of all instructions and denied further questions.    Please note that portions of this note may have been completed with a voice recognition program.

## 2025-06-18 PROBLEM — Z23 NEED FOR HEPATITIS B BOOSTER VACCINATION: Status: ACTIVE | Noted: 2025-06-18

## 2025-06-18 PROBLEM — Z86.39 HISTORY OF OBESITY: Status: ACTIVE | Noted: 2025-06-18

## 2025-06-18 PROBLEM — E66.09 CLASS 1 OBESITY DUE TO EXCESS CALORIES WITH SERIOUS COMORBIDITY AND BODY MASS INDEX (BMI) OF 32.0 TO 32.9 IN ADULT: Status: RESOLVED | Noted: 2023-06-27 | Resolved: 2025-06-18

## 2025-06-18 PROBLEM — E66.811 CLASS 1 OBESITY DUE TO EXCESS CALORIES WITH SERIOUS COMORBIDITY AND BODY MASS INDEX (BMI) OF 32.0 TO 32.9 IN ADULT: Status: RESOLVED | Noted: 2023-06-27 | Resolved: 2025-06-18

## 2025-06-19 DIAGNOSIS — I10 ESSENTIAL HYPERTENSION: ICD-10-CM

## 2025-06-19 RX ORDER — VALSARTAN AND HYDROCHLOROTHIAZIDE 80; 12.5 MG/1; MG/1
1 TABLET, FILM COATED ORAL DAILY
Qty: 90 TABLET | Refills: 0 | Status: SHIPPED | OUTPATIENT
Start: 2025-06-19

## 2025-08-11 RX ORDER — NORGESTIMATE AND ETHINYL ESTRADIOL 7DAYSX3 28
1 KIT ORAL DAILY
Qty: 28 TABLET | Refills: 11 | Status: SHIPPED | OUTPATIENT
Start: 2025-08-11

## 2025-08-12 ENCOUNTER — PRIOR AUTHORIZATION (OUTPATIENT)
Dept: FAMILY MEDICINE CLINIC | Facility: CLINIC | Age: 45
End: 2025-08-12
Payer: COMMERCIAL